# Patient Record
Sex: FEMALE | Race: WHITE | HISPANIC OR LATINO | Employment: UNEMPLOYED | URBAN - METROPOLITAN AREA
[De-identification: names, ages, dates, MRNs, and addresses within clinical notes are randomized per-mention and may not be internally consistent; named-entity substitution may affect disease eponyms.]

---

## 2024-09-10 ENCOUNTER — INITIAL PRENATAL (OUTPATIENT)
Dept: OBSTETRICS AND GYNECOLOGY | Facility: CLINIC | Age: 29
End: 2024-09-10
Payer: MEDICAID

## 2024-09-10 VITALS — SYSTOLIC BLOOD PRESSURE: 100 MMHG | DIASTOLIC BLOOD PRESSURE: 67 MMHG | WEIGHT: 123.4 LBS

## 2024-09-10 DIAGNOSIS — Z34.91 INITIAL OBSTETRIC VISIT IN FIRST TRIMESTER: Primary | ICD-10-CM

## 2024-09-10 DIAGNOSIS — Z3A.12 12 WEEKS GESTATION OF PREGNANCY: ICD-10-CM

## 2024-09-10 LAB — EXTERNAL HEPATITIS C AB: NEGATIVE

## 2024-09-10 PROCEDURE — 99203 OFFICE O/P NEW LOW 30 MIN: CPT | Performed by: OBSTETRICS & GYNECOLOGY

## 2024-09-10 RX ORDER — SWAB
1 SWAB, NON-MEDICATED MISCELLANEOUS DAILY
Qty: 90 EACH | Refills: 3 | Status: SHIPPED | OUTPATIENT
Start: 2024-09-10 | End: 2024-09-10

## 2024-09-10 RX ORDER — SWAB
1 SWAB, NON-MEDICATED MISCELLANEOUS DAILY
Qty: 90 EACH | Refills: 3 | Status: SHIPPED | OUTPATIENT
Start: 2024-09-10

## 2024-09-10 NOTE — PROGRESS NOTES
OB follow up New OB patient stated that she has been taken BP medicine and it make her BP very very low. Patient stated that she has  that is in bad shape and she feels like she is going to faint when standing up. Patient stated that she hasn't had US and is not for sure how far along she is. Patient staed that she feels a lot of pressure to her chest that is causing her to have shortness of breath     Leeroy Cunningham is a 28 y.o.  12w6d being seen today for her obstetrical visit.  Patient reports no complaints. Fetal movement: Not yet felt  This is her first visit for this pregnancy.  She is uncertain about her last menstrual period date.  She is not taking a prenatal vitamin.  She is not on any blood pressure medication.  She states that she did have preeclampsia with her first delivery that resulted in a normal vaginal delivery.    Her prenatal care is complicated by (and status): Nothing    Review of Systems  Cramping/contractions : Negative  Vaginal bleeding: Negative  Fetal movement is normal    /67   Wt 56 kg (123 lb 6.4 oz)   LMP 2024 (Approximate)     FHT: 150s BPM   Uterine Size: 16 cm       Assessment    Diagnoses and all orders for this visit:    1. Initial obstetric visit in first trimester (Primary)    2. 12 weeks gestation of pregnancy  -     OB Panel With HIV  -     Varicella Zoster Antibody, IgG  -     [id number] PLUS Core+SCA - Blood,        1) pregnancy at 12w6d   Exam and bedside ultrasound are more consistent with 15 or 16 weeks size.  Will get dating ultrasound along with prenatal panel and NIPT today.      Plan    Reviewed this stage of pregnancy  Problem list updated   Follow up in 4 weeks    Lewis Molina MD   9/10/2024  13:16 EDT

## 2024-09-11 LAB
ABO GROUP BLD: ABNORMAL
AMPHETAMINES UR QL SCN: NEGATIVE NG/ML
BARBITURATES UR QL SCN: NEGATIVE NG/ML
BASOPHILS # BLD AUTO: 0 X10E3/UL (ref 0–0.2)
BASOPHILS NFR BLD AUTO: 0 %
BENZODIAZ UR QL: NEGATIVE NG/ML
BLD GP AB SCN SERPL QL: NEGATIVE
BZE UR QL: NEGATIVE NG/ML
CANNABINOIDS UR QL SCN: NEGATIVE NG/ML
EOSINOPHIL # BLD AUTO: 0.2 X10E3/UL (ref 0–0.4)
EOSINOPHIL NFR BLD AUTO: 2 %
ERYTHROCYTE [DISTWIDTH] IN BLOOD BY AUTOMATED COUNT: 13.4 % (ref 11.7–15.4)
HBV SURFACE AG SERPL QL IA: NEGATIVE
HCT VFR BLD AUTO: 31.5 % (ref 34–46.6)
HCV AB SERPL QL IA: NORMAL
HCV IGG SERPL QL IA: NON REACTIVE
HGB BLD-MCNC: 10.3 G/DL (ref 11.1–15.9)
HIV 1+2 AB+HIV1 P24 AG SERPL QL IA: NON REACTIVE
IMM GRANULOCYTES # BLD AUTO: 0 X10E3/UL (ref 0–0.1)
IMM GRANULOCYTES NFR BLD AUTO: 0 %
LYMPHOCYTES # BLD AUTO: 1.9 X10E3/UL (ref 0.7–3.1)
LYMPHOCYTES NFR BLD AUTO: 21 %
MCH RBC QN AUTO: 30.9 PG (ref 26.6–33)
MCHC RBC AUTO-ENTMCNC: 32.7 G/DL (ref 31.5–35.7)
MCV RBC AUTO: 95 FL (ref 79–97)
METHADONE UR QL SCN: NEGATIVE NG/ML
MONOCYTES # BLD AUTO: 0.9 X10E3/UL (ref 0.1–0.9)
MONOCYTES NFR BLD AUTO: 10 %
NEUTROPHILS # BLD AUTO: 6.1 X10E3/UL (ref 1.4–7)
NEUTROPHILS NFR BLD AUTO: 67 %
OPIATES UR QL: NEGATIVE NG/ML
PCP UR QL SCN: NEGATIVE NG/ML
PLATELET # BLD AUTO: 300 X10E3/UL (ref 150–450)
PROPOXYPH UR QL SCN: NEGATIVE NG/ML
RBC # BLD AUTO: 3.33 X10E6/UL (ref 3.77–5.28)
RH BLD: POSITIVE
RPR SER QL: NON REACTIVE
RUBV IGG SERPL IA-ACNC: <0.9 INDEX
VZV IGG SER IA-ACNC: 923 INDEX
WBC # BLD AUTO: 9.2 X10E3/UL (ref 3.4–10.8)

## 2024-09-12 LAB
BACTERIA UR CULT: NO GROWTH
BACTERIA UR CULT: NORMAL

## 2024-09-13 LAB
C TRACH RRNA SPEC QL NAA+PROBE: NEGATIVE
N GONORRHOEA RRNA SPEC QL NAA+PROBE: NEGATIVE

## 2024-09-14 LAB
CFDNA.FET/CFDNA.TOTAL SFR FETUS: NORMAL %
CITATION REF LAB TEST: NORMAL
FET 13+18+21+X+Y ANEUP PLAS.CFDNA: NEGATIVE
FET CHR 21 TS PLAS.CFDNA QL: NEGATIVE
FET MS X RISK WBC.DNA+CFDNA QL: NOT DETECTED
FET SEX PLAS.CFDNA DOSAGE CFDNA: NORMAL
FET TS 13 RISK PLAS.CFDNA QL: NEGATIVE
FET TS 18 RISK WBC.DNA+CFDNA QL: NEGATIVE
FET X + Y ANEUP RISK PLAS.CFDNA SEQ-IMP: NOT DETECTED
GA EST FROM CONCEPTION DATE: NORMAL D
GESTATIONAL AGE > 9:: YES
LAB DIRECTOR NAME PROVIDER: NORMAL
LAB DIRECTOR NAME PROVIDER: NORMAL
LABORATORY COMMENT REPORT: NORMAL
LIMITATIONS OF THE TEST: NORMAL
NEGATIVE PREDICTIVE VALUE: NORMAL
NOTE: NORMAL
PERFORMANCE CHARACTERISTICS: NORMAL
POSITIVE PREDICTIVE VALUE: NORMAL
REF LAB TEST METHOD: NORMAL
TEST PERFORMANCE INFO SPEC: NORMAL

## 2024-09-16 ENCOUNTER — TELEPHONE (OUTPATIENT)
Dept: OBSTETRICS AND GYNECOLOGY | Facility: CLINIC | Age: 29
End: 2024-09-16
Payer: MEDICAID

## 2024-10-08 ENCOUNTER — REFERRAL TRIAGE (OUTPATIENT)
Dept: LABOR AND DELIVERY | Facility: HOSPITAL | Age: 29
End: 2024-10-08
Payer: MEDICAID

## 2024-10-08 ENCOUNTER — ROUTINE PRENATAL (OUTPATIENT)
Dept: OBSTETRICS AND GYNECOLOGY | Facility: CLINIC | Age: 29
End: 2024-10-08
Payer: MEDICAID

## 2024-10-08 VITALS — WEIGHT: 129.2 LBS | SYSTOLIC BLOOD PRESSURE: 98 MMHG | DIASTOLIC BLOOD PRESSURE: 63 MMHG

## 2024-10-08 DIAGNOSIS — Z34.82 PRENATAL CARE, SUBSEQUENT PREGNANCY IN SECOND TRIMESTER: ICD-10-CM

## 2024-10-08 DIAGNOSIS — Z87.59 HISTORY OF STILLBIRTH: ICD-10-CM

## 2024-10-08 DIAGNOSIS — O09.299 HX OF PREECLAMPSIA, PRIOR PREGNANCY, CURRENTLY PREGNANT: ICD-10-CM

## 2024-10-08 DIAGNOSIS — Z3A.21 21 WEEKS GESTATION OF PREGNANCY: Primary | ICD-10-CM

## 2024-10-08 LAB
GLUCOSE UR STRIP-MCNC: NEGATIVE MG/DL
HCT VFR BLD AUTO: 28.7 % (ref 34–46.6)
HGB BLD-MCNC: 9.7 G/DL (ref 12–15.9)
PROT UR STRIP-MCNC: ABNORMAL MG/DL

## 2024-10-08 RX ORDER — ASPIRIN 81 MG/1
81 TABLET, CHEWABLE ORAL DAILY
Qty: 90 TABLET | Refills: 3 | Status: SHIPPED | OUTPATIENT
Start: 2024-10-08

## 2024-10-08 RX ORDER — FERROUS SULFATE 325(65) MG
325 TABLET ORAL
Qty: 60 TABLET | Refills: 3 | Status: SHIPPED | OUTPATIENT
Start: 2024-10-08

## 2024-10-08 NOTE — PROGRESS NOTES
OB follow up     Leeroy Cunningham is a 29 y.o.  21w1d being seen today for her obstetrical visit.  Patient reports no complaints. Fetal movement: normal.  Fetal anatomy and growth appear normal today.  She is experiencing some fatigue and is concerned about her hemoglobin level of 10.3.  She is taking her prenatal vitamin regularly but does request iron supplementation.  I reviewed her obstetric history more thoroughly.  She was admitted to the hospital and watched for 2 weeks due to blood pressure and development of preeclampsia.  She unfortunately had a fetal demise while in the hospital.  She was induced in labor and delivered vaginally and had a postpartum hemorrhage that required transfusion.    Her prenatal care is complicated by (and status): History of preeclampsia    Review of Systems  Cramping/contractions : Negative  Vaginal bleeding: Negative  Fetal movement is normal    BP 98/63   Wt 58.6 kg (129 lb 3.2 oz)   LMP 2024 (Approximate)     FHT: -150 BPM   Uterine Size: size equals dates       Assessment    Diagnoses and all orders for this visit:    1. 21 weeks gestation of pregnancy (Primary)  -     POC Urinalysis Dipstick  -     Hemoglobin & Hematocrit, Blood    2. Prenatal care, subsequent pregnancy in second trimester    3. Hx of preeclampsia, prior pregnancy, currently pregnant    4. History of stillbirth    Other orders  -     aspirin 81 MG chewable tablet; Chew 1 tablet Daily.  Dispense: 90 tablet; Refill: 3  -     ferrous sulfate 325 (65 FE) MG tablet; Take 1 tablet by mouth Daily With Breakfast.  Dispense: 60 tablet; Refill: 3        1) pregnancy at 21w1d         Plan    Reviewed this stage of pregnancy  Problem list updated   Follow up in 4 weeks.  Will recheck her hemoglobin on next visit.  Will initiate antepartum fetal surveillance at 34 weeks and consider 38 to 39-week induction of labor.    Lewis Molina MD   10/8/2024  09:59 EDT

## 2024-10-16 ENCOUNTER — PATIENT OUTREACH (OUTPATIENT)
Dept: LABOR AND DELIVERY | Facility: HOSPITAL | Age: 29
End: 2024-10-16
Payer: MEDICAID

## 2024-10-16 NOTE — OUTREACH NOTE
[facility]  Enrollment    Current Estimated Gestational Age: 22w2d    Questions/Answers      Flowsheet Row Responses   Would like to participate? Yes   Date of Intake Visit 10/16/24            Elio Montiel RN  Maternity Nurse Navigator    10/16/2024, 14:55 EDT    [facility]  Intake    Current Estimated Gestational Age: 22w2d    Intake Assessment      Flowsheet Row Responses   Best Method for Contacting Cell   Currently Employed No   Able to keep appointments as scheduled Yes   Gender(s) and Name(s) f   Do you have a dentist? No   Resources Presently Utilizing: WIC (Women, Infant, Children), Other   Other Resources Utilizing: SNAP   Maternal Warning Signs Provided   Other Education HANDS, How to find a dentist, How to find a primary care provider, Insurance benefits/Incentives, Mental Health Services, Transportation Assistance            Tobacco, Alcohol, and Drug History     reports that she has never smoked. She has never used smokeless tobacco.   reports that she does not currently use alcohol.   reports no history of drug use.    Elio Montiel RN  Maternity Nurse Navigator    10/16/2024, 15:19 EDT    [facility]  Check-In    Current Estimated Gestational Age: 22w2d      Questions/Answers      Flowsheet Row Responses   Best Method for Contacting Cell   Demographics Reviewed Yes   Currently Employed No   Able to keep appointments as scheduled Yes   Gender(s) and Name(s) f   Baby Active/Feeling Fetal Movemen Yes   How are you presently feeling? good   Questions regarding prenatal visits or tests to be ordered? No   May I ask you questions about your substance use? --  [denies]   Resource/Environmental Concerns Reliable Transportation   Do you have any questions related to your care experience, your pregnancy, plans for delivery, any concerns, etc? No   Other Education HANDS, How to find a dentist, How to find a primary care provider, Insurance benefits/Incentives, Mental Health Services,  Transportation Assistance            Pt doing well today.  Has WIC and SNAP.  Enc to start YULI process and call insurance for pg rewards.  Dentistry sent-having molar pain recently.  Discussed [employer] and she plans to set up her account.  Sent intake packet +food.  Maternal warning s/s reviewed, pt verbalized understanding.  Encouraged to reach out with any needs.  Will follow up again in 2 wks.    , Chris [phone], used.     Elio Montiel RN  Maternity Nurse Navigator    10/16/2024, 15:19 EDT

## 2024-10-30 ENCOUNTER — PATIENT OUTREACH (OUTPATIENT)
Dept: LABOR AND DELIVERY | Facility: HOSPITAL | Age: 29
End: 2024-10-30
Payer: MEDICAID

## 2024-10-30 NOTE — OUTREACH NOTE
"[facility]  Check-In    Current Estimated Gestational Age: 24w2d      Questions/Answers      Flowsheet Row Responses   Best Method for Contacting Cell   Demographics Reviewed Yes   Currently Employed No   Able to keep appointments as scheduled Yes   Gender(s) and Name(s) f   Baby Active/Feeling Fetal Movemen Yes   How are you presently feeling? good   Questions regarding prenatal visits or tests to be ordered? No   Resource/Environmental Concerns Reliable Transportation   Do you have any questions related to your care experience, your pregnancy, plans for delivery, any concerns, etc? No   Other Education Transportation Assistance           [id number] used.     Pt doing well today.  Reports \"a lot of FM\".  Enc her to set up [employer] for appt on 11/7.  She has been calling dental but they have not had a .  MNN called Joceline Guzmán to schedule for Friday, possibly being moved to tomorrow.  OB office to send release over to dental office for pt to be seen.  No supplies yet for baby. Maternal warning s/s reviewed, pt verbalized understanding.  Encouraged to reach out with any needs.  Will follow up again prn.  Sent dental and FT info today.    Elio Montiel RN  Maternity Nurse Navigator    10/30/2024, 10:18 EDT          "

## 2024-11-07 ENCOUNTER — ROUTINE PRENATAL (OUTPATIENT)
Dept: OBSTETRICS AND GYNECOLOGY | Facility: CLINIC | Age: 29
End: 2024-11-07
Payer: MEDICAID

## 2024-11-07 VITALS — SYSTOLIC BLOOD PRESSURE: 99 MMHG | WEIGHT: 135 LBS | DIASTOLIC BLOOD PRESSURE: 66 MMHG

## 2024-11-07 DIAGNOSIS — Z34.82 PRENATAL CARE, SUBSEQUENT PREGNANCY IN SECOND TRIMESTER: ICD-10-CM

## 2024-11-07 DIAGNOSIS — O99.019 MATERNAL ANEMIA IN PREGNANCY, ANTEPARTUM: ICD-10-CM

## 2024-11-07 DIAGNOSIS — Z3A.25 25 WEEKS GESTATION OF PREGNANCY: Primary | ICD-10-CM

## 2024-11-07 DIAGNOSIS — Z87.59 HISTORY OF STILLBIRTH: ICD-10-CM

## 2024-11-07 LAB
GLUCOSE UR STRIP-MCNC: NEGATIVE MG/DL
PROT UR STRIP-MCNC: ABNORMAL MG/DL

## 2024-11-07 NOTE — PROGRESS NOTES
OB follow up     Leeroy Cunningham is a 29 y.o.  25w3d being seen today for her obstetrical visit.  Patient reports no complaints. Fetal movement: normal.  She does report some periodic vagal episodes.  She is taking her prenatal vitamin and her iron supplement regularly and she is also taking the baby aspirin.    Her prenatal care is complicated by (and status): Maternal anemia and history of stillbirth related to severe preeclampsia    Review of Systems  Cramping/contractions : Negative  Vaginal bleeding: Negative  Fetal movement normal    BP 99/66   Wt 61.2 kg (135 lb)   LMP 2024 (Approximate)     FHT: 160 BPM   Uterine Size: size equals dates       Assessment    Diagnoses and all orders for this visit:    1. 25 weeks gestation of pregnancy (Primary)  -     POC Urinalysis Dipstick  -     Gestational Screen 1 Hr ([facility]); Future  -     Hemoglobin & Hematocrit, Blood; Future  -     Treponema pallidum AB w/Reflex RPR; Future    2. Prenatal care, subsequent pregnancy in second trimester    3. History of stillbirth    4. Maternal anemia in pregnancy, antepartum        1) pregnancy at 25w3d         Plan    Reviewed this stage of pregnancy  Problem list updated   Follow up in 3 weeks.  Will increase salt load and oral intake.  Will do 1 hour glucose on next visit.    Lewis Molina MD   2024  11:43 EST

## 2024-12-04 ENCOUNTER — PATIENT OUTREACH (OUTPATIENT)
Dept: LABOR AND DELIVERY | Facility: HOSPITAL | Age: 29
End: 2024-12-04
Payer: MEDICAID

## 2024-12-04 NOTE — OUTREACH NOTE
[facility]  Unable to Reach    Questions/Answers      Flowsheet Row Responses   Pending Outreach Prenatal Check-in   Call Attempt First  [28]   Outcome Left message           [phone] used, left msg with pt's friend who has phone.  Pt will have phone tomorrow, will try again at that time.    Elio Montiel RN  Maternity Nurse Navigator    12/4/2024, 09:07 EST

## 2024-12-05 ENCOUNTER — PATIENT OUTREACH (OUTPATIENT)
Dept: LABOR AND DELIVERY | Facility: HOSPITAL | Age: 29
End: 2024-12-05
Payer: MEDICAID

## 2024-12-05 ENCOUNTER — ROUTINE PRENATAL (OUTPATIENT)
Dept: OBSTETRICS AND GYNECOLOGY | Facility: CLINIC | Age: 29
End: 2024-12-05
Payer: MEDICAID

## 2024-12-05 VITALS — DIASTOLIC BLOOD PRESSURE: 66 MMHG | SYSTOLIC BLOOD PRESSURE: 103 MMHG | WEIGHT: 141.2 LBS

## 2024-12-05 DIAGNOSIS — Z87.59 HISTORY OF STILLBIRTH: ICD-10-CM

## 2024-12-05 DIAGNOSIS — Z34.83 PRENATAL CARE, SUBSEQUENT PREGNANCY IN THIRD TRIMESTER: ICD-10-CM

## 2024-12-05 DIAGNOSIS — Z3A.29 29 WEEKS GESTATION OF PREGNANCY: Primary | ICD-10-CM

## 2024-12-05 DIAGNOSIS — O99.019 MATERNAL ANEMIA IN PREGNANCY, ANTEPARTUM: ICD-10-CM

## 2024-12-05 LAB
GLUCOSE UR STRIP-MCNC: NEGATIVE MG/DL
PROT UR STRIP-MCNC: NEGATIVE MG/DL

## 2024-12-05 NOTE — PROGRESS NOTES
OB follow up     Leeroy Cunningham is a 29 y.o.  29w3d being seen today for her obstetrical visit.  Patient reports backache. Fetal movement: normal.    Her prenatal care is complicated by (and status): History of term stillbirth related to preeclampsia    Review of Systems  Cramping/contractions : Occasional  Vaginal bleeding: Negative  Fetal movement is normal    /66   Wt 64 kg (141 lb 3.2 oz)   LMP 2024 (Approximate)     FHT: 152 BPM   Uterine Size: size equals dates       Assessment    Diagnoses and all orders for this visit:    1. 29 weeks gestation of pregnancy (Primary)  -     POC Urinalysis Dipstick    2. Prenatal care, subsequent pregnancy in third trimester    3. Maternal anemia in pregnancy, antepartum    4. History of stillbirth        1) pregnancy at 29w3d         Plan    Reviewed this stage of pregnancy  Problem list updated   Follow up in 3 weeks and will get growth ultrasound on that day and discuss Tdap and RSV vaccines on that visit.  I gave her written information about sciatic discomfort and measures to improve her discomfort while pregnant.  1 hour glucose being done today and follow-up hemoglobin being done.  Lewis Molina MD   2024  09:21 EST

## 2024-12-05 NOTE — OUTREACH NOTE
[facility]  Unable to Reach    Questions/Answers      Flowsheet Row Responses   Pending Outreach Prenatal Check-in   Call Attempt Second  [29]   Outcome Left message   Unable to reach comments:  [phone] used.           [id number] used.     Friend has her phone today.  He will let pt know to check her mychart and call/msg MNN with any needs.    Elio Montiel RN  Maternity Nurse Navigator    12/5/2024, 10:44 EST

## 2024-12-11 ENCOUNTER — PATIENT OUTREACH (OUTPATIENT)
Dept: LABOR AND DELIVERY | Facility: HOSPITAL | Age: 29
End: 2024-12-11
Payer: MEDICAID

## 2024-12-11 NOTE — OUTREACH NOTE
[facility]  Check-In    Current Estimated Gestational Age: 30w2d      Questions/Answers      Flowsheet Row Responses   Best Method for Contacting Cell   Demographics Reviewed Yes   Currently Employed No   Able to keep appointments as scheduled Yes   Gender(s) and Name(s) f   Baby Active/Feeling Fetal Movemen Yes   How are you presently feeling? good   Questions regarding prenatal visits or tests to be ordered? No   Resource/Environmental Concerns None   Do you have any questions related to your care experience, your pregnancy, plans for delivery, any concerns, etc? No   Other Education How to find a pediatrician          Pt doing well.  Reviewed 28wk packet prev sent.  Chose Dr Chavez for ped.  Maternal warning s/s reviewed, pt verbalized understanding.  Encouraged to reach out with any needs.  Will follow up again around 34wks.     [id number] used.    Eloi Montiel RN  Maternity Nurse Navigator    12/11/2024, 11:12 EST

## 2025-01-02 ENCOUNTER — ROUTINE PRENATAL (OUTPATIENT)
Dept: OBSTETRICS AND GYNECOLOGY | Facility: CLINIC | Age: 30
End: 2025-01-02
Payer: MEDICAID

## 2025-01-02 VITALS — WEIGHT: 144.8 LBS | SYSTOLIC BLOOD PRESSURE: 99 MMHG | DIASTOLIC BLOOD PRESSURE: 65 MMHG

## 2025-01-02 DIAGNOSIS — Z87.59 HISTORY OF STILLBIRTH: ICD-10-CM

## 2025-01-02 DIAGNOSIS — O99.019 MATERNAL ANEMIA IN PREGNANCY, ANTEPARTUM: ICD-10-CM

## 2025-01-02 DIAGNOSIS — Z34.83 PRENATAL CARE, SUBSEQUENT PREGNANCY IN THIRD TRIMESTER: ICD-10-CM

## 2025-01-02 DIAGNOSIS — Z3A.33 33 WEEKS GESTATION OF PREGNANCY: Primary | ICD-10-CM

## 2025-01-02 LAB
GLUCOSE UR STRIP-MCNC: NEGATIVE MG/DL
PROT UR STRIP-MCNC: ABNORMAL MG/DL

## 2025-01-02 NOTE — PROGRESS NOTES
OB follow up     Leeroy Cunningham is a 29 y.o.  33w3d being seen today for her obstetrical visit.  Patient reports no complaints. Fetal movement: normal.  Comparisons previous obstetric ultrasound. Today study shows cephalic presentation with low normal amniotic fluid volume with amniotic fluid index of 8.6.. Fetal heart rate is 134 bpm. Fetal growth today is at 53rd percentile with abdominal circumference at 74th percentile for 33 weeks 3 days.   Her prenatal care is complicated by (and status): History of preeclampsia and fetal demise at term    Review of Systems  Cramping/contractions : Negative  Vaginal bleeding: Negative  Fetal movement is normal    BP 99/65   Wt 65.7 kg (144 lb 12.8 oz)   LMP 2024 (Approximate)     FHT: 134 BPM   Uterine Size: size equals dates       Assessment    Diagnoses and all orders for this visit:    1. 33 weeks gestation of pregnancy (Primary)  -     POC Urinalysis Dipstick    2. Prenatal care, subsequent pregnancy in third trimester    3. History of stillbirth    4. Maternal anemia in pregnancy, antepartum  -     CBC (No Diff); Future        1) pregnancy at 33w3d         Plan    Reviewed this stage of pregnancy  Problem list updated   Follow up in 2 weeks.  She is getting Tdap and RSV vaccines today.  Will plan CBC on her next visit.  Blood pressure is doing well.  Current plan would be induction of labor at 39 weeks (will likely schedule for 2/10/2025)    Lewis Molina MD   2025  10:15 EST

## 2025-01-08 DIAGNOSIS — O99.713 PRURITUS OF PREGNANCY IN THIRD TRIMESTER: Primary | ICD-10-CM

## 2025-01-08 DIAGNOSIS — L29.9 PRURITUS OF PREGNANCY IN THIRD TRIMESTER: Primary | ICD-10-CM

## 2025-01-09 ENCOUNTER — LAB (OUTPATIENT)
Dept: OBSTETRICS AND GYNECOLOGY | Facility: CLINIC | Age: 30
End: 2025-01-09
Payer: MEDICAID

## 2025-01-09 ENCOUNTER — PATIENT OUTREACH (OUTPATIENT)
Dept: LABOR AND DELIVERY | Facility: HOSPITAL | Age: 30
End: 2025-01-09
Payer: MEDICAID

## 2025-01-09 DIAGNOSIS — O99.713 PRURITUS OF PREGNANCY IN THIRD TRIMESTER: ICD-10-CM

## 2025-01-09 DIAGNOSIS — O99.019 MATERNAL ANEMIA IN PREGNANCY, ANTEPARTUM: ICD-10-CM

## 2025-01-09 DIAGNOSIS — L29.9 PRURITUS OF PREGNANCY IN THIRD TRIMESTER: ICD-10-CM

## 2025-01-09 NOTE — OUTREACH NOTE
[facility]  Unable to Reach    Questions/Answers      Flowsheet Row Responses   Pending Outreach Prenatal Check-in   Call Attempt First  [34]   Outcome Left message   Unable to reach comments:  [phone] used.            Left vm, encouraged to reach out with any needs. Will call again at a later date.      Elio Montiel RN  Maternity Nurse Navigator    1/9/2025, 13:26 EST

## 2025-01-10 LAB
ALBUMIN SERPL-MCNC: 3.5 G/DL (ref 4–5)
ALP SERPL-CCNC: 148 IU/L (ref 44–121)
ALT SERPL-CCNC: 77 IU/L (ref 0–32)
AST SERPL-CCNC: 40 IU/L (ref 0–40)
BILIRUB SERPL-MCNC: 0.6 MG/DL (ref 0–1.2)
BUN SERPL-MCNC: 7 MG/DL (ref 6–20)
BUN/CREAT SERPL: 14 (ref 9–23)
CALCIUM SERPL-MCNC: 9.3 MG/DL (ref 8.7–10.2)
CHLORIDE SERPL-SCNC: 103 MMOL/L (ref 96–106)
CO2 SERPL-SCNC: 20 MMOL/L (ref 20–29)
CREAT SERPL-MCNC: 0.5 MG/DL (ref 0.57–1)
EGFRCR SERPLBLD CKD-EPI 2021: 131 ML/MIN/1.73
ERYTHROCYTE [DISTWIDTH] IN BLOOD BY AUTOMATED COUNT: 13.1 % (ref 11.7–15.4)
GLOBULIN SER CALC-MCNC: 2.8 G/DL (ref 1.5–4.5)
GLUCOSE SERPL-MCNC: 130 MG/DL (ref 70–99)
HCT VFR BLD AUTO: 31.9 % (ref 34–46.6)
HGB BLD-MCNC: 10.5 G/DL (ref 11.1–15.9)
MCH RBC QN AUTO: 31.4 PG (ref 26.6–33)
MCHC RBC AUTO-ENTMCNC: 32.9 G/DL (ref 31.5–35.7)
MCV RBC AUTO: 96 FL (ref 79–97)
PLATELET # BLD AUTO: 336 X10E3/UL (ref 150–450)
POTASSIUM SERPL-SCNC: 3.9 MMOL/L (ref 3.5–5.2)
PROT SERPL-MCNC: 6.3 G/DL (ref 6–8.5)
RBC # BLD AUTO: 3.34 X10E6/UL (ref 3.77–5.28)
SODIUM SERPL-SCNC: 136 MMOL/L (ref 134–144)
WBC # BLD AUTO: 8.5 X10E3/UL (ref 3.4–10.8)

## 2025-01-12 LAB — BILE AC SERPL-SCNC: 10.2 UMOL/L (ref 0–10)

## 2025-01-15 ENCOUNTER — PATIENT OUTREACH (OUTPATIENT)
Dept: LABOR AND DELIVERY | Facility: HOSPITAL | Age: 30
End: 2025-01-15
Payer: MEDICAID

## 2025-01-15 NOTE — OUTREACH NOTE
[facility]  Unable to Reach    Questions/Answers      Flowsheet Row Responses   Pending Outreach Prenatal Check-in   Call Attempt Second   Outcome Left message, [employer] message sent to patient           [phone] used.     Left vm and sent 36wk packet via [facility], encouraged to reach out with any needs. Will call again at a later date.      Elio Montiel RN  Maternity Nurse Navigator    1/15/2025, 11:52 EST

## 2025-01-23 ENCOUNTER — ROUTINE PRENATAL (OUTPATIENT)
Dept: OBSTETRICS AND GYNECOLOGY | Facility: CLINIC | Age: 30
End: 2025-01-23
Payer: MEDICAID

## 2025-01-23 VITALS — SYSTOLIC BLOOD PRESSURE: 110 MMHG | DIASTOLIC BLOOD PRESSURE: 73 MMHG | WEIGHT: 151 LBS

## 2025-01-23 DIAGNOSIS — O99.019 MATERNAL ANEMIA IN PREGNANCY, ANTEPARTUM: ICD-10-CM

## 2025-01-23 DIAGNOSIS — Z34.83 PRENATAL CARE, SUBSEQUENT PREGNANCY IN THIRD TRIMESTER: ICD-10-CM

## 2025-01-23 DIAGNOSIS — Z3A.36 36 WEEKS GESTATION OF PREGNANCY: Primary | ICD-10-CM

## 2025-01-23 DIAGNOSIS — Z87.59 HISTORY OF STILLBIRTH: ICD-10-CM

## 2025-01-23 LAB
GLUCOSE UR STRIP-MCNC: NEGATIVE MG/DL
PROT UR STRIP-MCNC: NEGATIVE MG/DL

## 2025-01-23 NOTE — PROGRESS NOTES
OB follow up     Leeroy Cunningham is a 29 y.o.  36w3d being seen today for her obstetrical visit.  Patient reports no complaints. Fetal movement: normal.    Her prenatal care is complicated by (and status): History of IUFD    Review of Systems  Cramping/contractions : Negative  Vaginal bleeding: Negative  Fetal movement is normal    /73   Wt 68.5 kg (151 lb)   LMP 2024 (Approximate)     FHT: 138 BPM   Uterine Size: size equals dates       Assessment    Diagnoses and all orders for this visit:    1. 36 weeks gestation of pregnancy (Primary)  -     Group B Streptococcus Culture - Swab, Vaginal/Rectum  -     POC Urinalysis Dipstick    2. Prenatal care, subsequent pregnancy in third trimester    3. Maternal anemia in pregnancy, antepartum    4. History of stillbirth        1) pregnancy at 36w3d         Plan    Reviewed this stage of pregnancy  Problem list updated   Follow up in 1 week.  Group B strep screen done today.  Cervix is 1/60/-2 and posterior.  Will see weekly with biophysical profile.  Blood pressure is doing very well.  Will likely be looking at 39-week induction.  Will discuss further on next visit.    Lewis Molina MD   2025  09:25 EST

## 2025-01-27 LAB — B-HEM STREP SPEC QL CULT: NEGATIVE

## 2025-01-27 RX ORDER — METHYLPREDNISOLONE 4 MG/1
TABLET ORAL
Qty: 21 TABLET | Refills: 1 | Status: SHIPPED | OUTPATIENT
Start: 2025-01-27

## 2025-01-30 ENCOUNTER — ROUTINE PRENATAL (OUTPATIENT)
Dept: OBSTETRICS AND GYNECOLOGY | Facility: CLINIC | Age: 30
End: 2025-01-30
Payer: MEDICAID

## 2025-01-30 ENCOUNTER — TELEPHONE (OUTPATIENT)
Dept: LABOR AND DELIVERY | Facility: HOSPITAL | Age: 30
End: 2025-01-30
Payer: MEDICAID

## 2025-01-30 VITALS — DIASTOLIC BLOOD PRESSURE: 73 MMHG | WEIGHT: 153 LBS | SYSTOLIC BLOOD PRESSURE: 111 MMHG

## 2025-01-30 DIAGNOSIS — Z3A.37 37 WEEKS GESTATION OF PREGNANCY: Primary | ICD-10-CM

## 2025-01-30 DIAGNOSIS — Z34.83 PRENATAL CARE, SUBSEQUENT PREGNANCY IN THIRD TRIMESTER: ICD-10-CM

## 2025-01-30 DIAGNOSIS — Z87.59 HISTORY OF STILLBIRTH: ICD-10-CM

## 2025-01-30 LAB
GLUCOSE UR STRIP-MCNC: ABNORMAL MG/DL
PROT UR STRIP-MCNC: NEGATIVE MG/DL

## 2025-01-30 NOTE — PROGRESS NOTES
OB follow up     Leeroy Cunningham is a 29 y.o.  37w3d being seen today for her obstetrical visit.  Patient reports no complaints. Fetal movement: normal.  Comparison is previous obstetric ultrasound. Today's study shows cephalic presentation with normal amniotic fluid volume. Fetal heart rate is 138 bpm. Fetal growth today is at 65th percentile with abdominal circumference at 96 percentile for 37 weeks 3 days. Biophysical profile is 8/8   Her prenatal care is complicated by (and status): History of preeclampsia with term demise    Review of Systems  Cramping/contractions : Negative  Vaginal bleeding: Negative  Fetal movement is normal    /73   Wt 69.4 kg (153 lb)   LMP 2024 (Approximate)     FHT: 138 BPM   Uterine Size: size equals dates       Assessment    Diagnoses and all orders for this visit:    1. 37 weeks gestation of pregnancy (Primary)  -     POC Urinalysis Dipstick    2. Prenatal care, subsequent pregnancy in third trimester    3. History of stillbirth        1) pregnancy at 37w3d         Plan    Reviewed this stage of pregnancy  Problem list updated   Follow up in 1 week.  She will get biophysical profile next week.  Her blood pressure is stable with negative proteinuria.  I told her that I will be out of town next week but that we will schedule an induction of labor at midnight on 2/10/2025 ( going into Monday).    Lewis Molina MD   2025  09:57 EST

## 2025-01-30 NOTE — TELEPHONE ENCOUNTER
[id number] used.  Pt has consented to use [employer] for transportation to OB office.  Pt missed first [employer] ride.  MNN requested new ride, pt in car by end of phone call. Flexible return ride home.  Call placed to office/ to know pt's ETA.  Elio BUSTOS RN

## 2025-02-03 ENCOUNTER — PATIENT OUTREACH (OUTPATIENT)
Dept: LABOR AND DELIVERY | Facility: HOSPITAL | Age: 30
End: 2025-02-03
Payer: MEDICAID

## 2025-02-03 NOTE — OUTREACH NOTE
[facility]  Check-In    Current Estimated Gestational Age: 38w0d      Questions/Answers      Flowsheet Row Responses   Best Method for Contacting Cell   Demographics Reviewed Yes   Currently Employed Yes   Able to keep appointments as scheduled Yes   Gender(s) and Name(s) f   Baby Active/Feeling Fetal Movemen Yes   How are you presently feeling? good   Are you having any of the following symptoms? Other   Other Symptoms: itching-hands, feet, torso-OB aware   Questions regarding prenatal visits or tests to be ordered? No   Supplies ready for baby Clothing, Crib, Diapers, Feeding Supplies   Resource/Environmental Concerns None   Do you have any questions related to your care experience, your pregnancy, plans for delivery, any concerns, etc? No             [phone] used.    Pt doing well today.  Will have all baby supplies by admission.  Still reports itching, has been taking meds prescribed by OB.  Has not ordered breast pump, will get while in pt. Discussed IOL and visitors policy.  Previously sent 36wk packet.  Maternal warning s/s reviewed, pt verbalized understanding.  Encouraged to reach out with any needs.  Will follow up again RANCHO.    Elio Montiel RN  Maternity Nurse Navigator    2/3/2025, 12:37 EST

## 2025-02-06 ENCOUNTER — ROUTINE PRENATAL (OUTPATIENT)
Dept: OBSTETRICS AND GYNECOLOGY | Facility: CLINIC | Age: 30
End: 2025-02-06
Payer: MEDICAID

## 2025-02-06 VITALS — SYSTOLIC BLOOD PRESSURE: 109 MMHG | WEIGHT: 152 LBS | DIASTOLIC BLOOD PRESSURE: 68 MMHG

## 2025-02-06 DIAGNOSIS — Z34.93 PRENATAL CARE IN THIRD TRIMESTER: ICD-10-CM

## 2025-02-06 DIAGNOSIS — Z3A.38 38 WEEKS GESTATION OF PREGNANCY: Primary | ICD-10-CM

## 2025-02-06 DIAGNOSIS — Z87.59 HISTORY OF STILLBIRTH: ICD-10-CM

## 2025-02-06 NOTE — PROGRESS NOTES
Chief Complaint   Patient presents with    Routine Prenatal Visit       OB follow up     Leeroy Cunningham is a 29 y.o.  38w3d being seen today for her obstetrical visit.  Patient reports no complaints. Fetal movement: normal.  used for this visit    Review of Systems  Cramping/contractions: denies  Vaginal bleeding: denies  Fetal movement: normal    /68   Wt 68.9 kg (152 lb)   LMP 2024 (Approximate)   Prenatal Assessment  Fetal Heart Rate: 140  Movement: Present  Presentation: Vertex  Dilation/Effacement/Station  Dilation: 1  Effacement (%): 50  Station: -3  Edema  LLE Edema: None  RLE Edema: None  Facial Edema: None    Assessment/Plan    Diagnoses and all orders for this visit:    1. 38 weeks gestation of pregnancy (Primary)    2. Prenatal care in third trimester    3. History of stillbirth      BPP , BABAK: 15.13 cm, Placental location: Anterior, Fetal position: Vertex  Scheduled IOL 25  Reviewed fetal kick counts  Reviewed S&S labor  GBS negative  Reviewed this stage of pregnancy  Problem list updated     Follow up in 1 week(s) with Dr. Molina    I spent 20 minutes caring for Leeroy on this date of service. This time includes time spent by me in the following activities: preparing for the visit, reviewing tests, performing a medically appropriate examination and/or evaluation, counseling and educating the patient/family/caregiver, referring and communicating with other health care professionals, documenting information in the medical record, independently interpreting results and communicating that information with the patient/family/caregiver, care coordination, ordering medications, ordering test(s), obtaining a separately obtained history, and reviewing a separately obtained history    Isabella Sargent, APRN  2025  10:29 EST\

## 2025-02-09 ENCOUNTER — ANESTHESIA EVENT (OUTPATIENT)
Dept: LABOR AND DELIVERY | Facility: HOSPITAL | Age: 30
End: 2025-02-09
Payer: MEDICAID

## 2025-02-09 ENCOUNTER — ANESTHESIA (OUTPATIENT)
Dept: LABOR AND DELIVERY | Facility: HOSPITAL | Age: 30
End: 2025-02-09
Payer: MEDICAID

## 2025-02-09 ENCOUNTER — HOSPITAL ENCOUNTER (INPATIENT)
Facility: HOSPITAL | Age: 30
LOS: 1 days | Discharge: HOME | End: 2025-02-10
Attending: OBSTETRICS & GYNECOLOGY | Admitting: OBSTETRICS & GYNECOLOGY
Payer: MEDICAID

## 2025-02-09 DIAGNOSIS — R79.89 ELEVATED LFTS: Primary | ICD-10-CM

## 2025-02-09 PROBLEM — Z34.90 PREGNANCY: Status: ACTIVE | Noted: 2025-02-09

## 2025-02-09 LAB
A1 MICROGLOB PLACENTAL VAG QL: NEGATIVE
ABO GROUP BLD: NORMAL
ALBUMIN SERPL-MCNC: 3.6 G/DL (ref 3.5–5.2)
ALBUMIN/GLOB SERPL: 0.9 G/DL
ALP SERPL-CCNC: 245 U/L (ref 39–117)
ALT SERPL W P-5'-P-CCNC: 123 U/L (ref 1–33)
ANION GAP SERPL CALCULATED.3IONS-SCNC: 13 MMOL/L (ref 5–15)
AST SERPL-CCNC: 55 U/L (ref 1–32)
BASOPHILS # BLD AUTO: 0.03 10*3/MM3 (ref 0–0.2)
BASOPHILS NFR BLD AUTO: 0.3 % (ref 0–1.5)
BILIRUB SERPL-MCNC: 0.8 MG/DL (ref 0–1.2)
BLD GP AB SCN SERPL QL: NEGATIVE
BUN SERPL-MCNC: 15 MG/DL (ref 6–20)
BUN/CREAT SERPL: 24.2 (ref 7–25)
CALCIUM SPEC-SCNC: 9.4 MG/DL (ref 8.6–10.5)
CHLORIDE SERPL-SCNC: 102 MMOL/L (ref 98–107)
CO2 SERPL-SCNC: 20 MMOL/L (ref 22–29)
CREAT SERPL-MCNC: 0.62 MG/DL (ref 0.57–1)
CREAT UR-MCNC: 59.8 MG/DL
DEPRECATED RDW RBC AUTO: 44.6 FL (ref 37–54)
EGFRCR SERPLBLD CKD-EPI 2021: 124.6 ML/MIN/1.73
EOSINOPHIL # BLD AUTO: 0.12 10*3/MM3 (ref 0–0.4)
EOSINOPHIL NFR BLD AUTO: 1.1 % (ref 0.3–6.2)
ERYTHROCYTE [DISTWIDTH] IN BLOOD BY AUTOMATED COUNT: 13.1 % (ref 12.3–15.4)
GLOBULIN UR ELPH-MCNC: 3.8 GM/DL
GLUCOSE SERPL-MCNC: 87 MG/DL (ref 65–99)
HCT VFR BLD AUTO: 35.4 % (ref 34–46.6)
HGB BLD-MCNC: 11.8 G/DL (ref 12–15.9)
IMM GRANULOCYTES # BLD AUTO: 0.08 10*3/MM3 (ref 0–0.05)
IMM GRANULOCYTES NFR BLD AUTO: 0.8 % (ref 0–0.5)
LYMPHOCYTES # BLD AUTO: 2.68 10*3/MM3 (ref 0.7–3.1)
LYMPHOCYTES NFR BLD AUTO: 25.2 % (ref 19.6–45.3)
MCH RBC QN AUTO: 31.1 PG (ref 26.6–33)
MCHC RBC AUTO-ENTMCNC: 33.3 G/DL (ref 31.5–35.7)
MCV RBC AUTO: 93.2 FL (ref 79–97)
MONOCYTES # BLD AUTO: 0.93 10*3/MM3 (ref 0.1–0.9)
MONOCYTES NFR BLD AUTO: 8.7 % (ref 5–12)
NEUTROPHILS NFR BLD AUTO: 6.8 10*3/MM3 (ref 1.7–7)
NEUTROPHILS NFR BLD AUTO: 63.9 % (ref 42.7–76)
NRBC BLD AUTO-RTO: 0 /100 WBC (ref 0–0.2)
PLATELET # BLD AUTO: 367 10*3/MM3 (ref 140–450)
PMV BLD AUTO: 9.7 FL (ref 6–12)
POTASSIUM SERPL-SCNC: 3.7 MMOL/L (ref 3.5–5.2)
PROT ?TM UR-MCNC: 12.1 MG/DL
PROT SERPL-MCNC: 7.4 G/DL (ref 6–8.5)
PROT/CREAT UR: 202.3 MG/G CREA (ref 0–200)
RBC # BLD AUTO: 3.8 10*6/MM3 (ref 3.77–5.28)
RH BLD: POSITIVE
SODIUM SERPL-SCNC: 135 MMOL/L (ref 136–145)
T&S EXPIRATION DATE: NORMAL
TREPONEMA PALLIDUM IGG+IGM AB [PRESENCE] IN SERUM OR PLASMA BY IMMUNOASSAY: NORMAL
WBC NRBC COR # BLD AUTO: 10.64 10*3/MM3 (ref 3.4–10.8)

## 2025-02-09 PROCEDURE — 82570 ASSAY OF URINE CREATININE: CPT | Performed by: OBSTETRICS & GYNECOLOGY

## 2025-02-09 PROCEDURE — 84156 ASSAY OF PROTEIN URINE: CPT | Performed by: OBSTETRICS & GYNECOLOGY

## 2025-02-09 PROCEDURE — 86850 RBC ANTIBODY SCREEN: CPT | Performed by: OBSTETRICS & GYNECOLOGY

## 2025-02-09 PROCEDURE — 99202 OFFICE O/P NEW SF 15 MIN: CPT | Performed by: OBSTETRICS & GYNECOLOGY

## 2025-02-09 PROCEDURE — 0HQ9XZZ REPAIR PERINEUM SKIN, EXTERNAL APPROACH: ICD-10-PCS | Performed by: OBSTETRICS & GYNECOLOGY

## 2025-02-09 PROCEDURE — 25810000003 LACTATED RINGERS PER 1000 ML: Performed by: OBSTETRICS & GYNECOLOGY

## 2025-02-09 PROCEDURE — 25010000002 ONDANSETRON PER 1 MG: Performed by: ANESTHESIOLOGY

## 2025-02-09 PROCEDURE — C1755 CATHETER, INTRASPINAL: HCPCS | Performed by: ANESTHESIOLOGY

## 2025-02-09 PROCEDURE — 86780 TREPONEMA PALLIDUM: CPT | Performed by: OBSTETRICS & GYNECOLOGY

## 2025-02-09 PROCEDURE — 25810000003 LACTATED RINGERS SOLUTION: Performed by: OBSTETRICS & GYNECOLOGY

## 2025-02-09 PROCEDURE — 25010000002 LIDOCAINE-EPINEPHRINE (PF) 1 %-1:200000 SOLUTION: Performed by: ANESTHESIOLOGY

## 2025-02-09 PROCEDURE — 85025 COMPLETE CBC W/AUTO DIFF WBC: CPT | Performed by: OBSTETRICS & GYNECOLOGY

## 2025-02-09 PROCEDURE — 86901 BLOOD TYPING SEROLOGIC RH(D): CPT | Performed by: OBSTETRICS & GYNECOLOGY

## 2025-02-09 PROCEDURE — 80053 COMPREHEN METABOLIC PANEL: CPT | Performed by: OBSTETRICS & GYNECOLOGY

## 2025-02-09 PROCEDURE — 84112 EVAL AMNIOTIC FLUID PROTEIN: CPT | Performed by: OBSTETRICS & GYNECOLOGY

## 2025-02-09 PROCEDURE — 86900 BLOOD TYPING SEROLOGIC ABO: CPT | Performed by: OBSTETRICS & GYNECOLOGY

## 2025-02-09 RX ORDER — DOCUSATE SODIUM 100 MG/1
100 CAPSULE, LIQUID FILLED ORAL 2 TIMES DAILY
Status: DISCONTINUED | OUTPATIENT
Start: 2025-02-09 | End: 2025-02-10 | Stop reason: HOSPADM

## 2025-02-09 RX ORDER — BUTORPHANOL TARTRATE 2 MG/ML
2 INJECTION, SOLUTION INTRAMUSCULAR; INTRAVENOUS
Status: DISCONTINUED | OUTPATIENT
Start: 2025-02-09 | End: 2025-02-09 | Stop reason: HOSPADM

## 2025-02-09 RX ORDER — CARBOPROST TROMETHAMINE 250 UG/ML
250 INJECTION, SOLUTION INTRAMUSCULAR
Status: DISCONTINUED | OUTPATIENT
Start: 2025-02-09 | End: 2025-02-09 | Stop reason: HOSPADM

## 2025-02-09 RX ORDER — ONDANSETRON 4 MG/1
4 TABLET, ORALLY DISINTEGRATING ORAL EVERY 6 HOURS PRN
Status: DISCONTINUED | OUTPATIENT
Start: 2025-02-09 | End: 2025-02-09 | Stop reason: HOSPADM

## 2025-02-09 RX ORDER — PHYTONADIONE 1 MG/.5ML
INJECTION, EMULSION INTRAMUSCULAR; INTRAVENOUS; SUBCUTANEOUS
Status: ACTIVE
Start: 2025-02-09 | End: 2025-02-10

## 2025-02-09 RX ORDER — OXYTOCIN/0.9 % SODIUM CHLORIDE 30/500 ML
125 PLASTIC BAG, INJECTION (ML) INTRAVENOUS ONCE AS NEEDED
Status: COMPLETED | OUTPATIENT
Start: 2025-02-09 | End: 2025-02-09

## 2025-02-09 RX ORDER — HYDROCORTISONE 25 MG/G
CREAM TOPICAL AS NEEDED
Status: DISCONTINUED | OUTPATIENT
Start: 2025-02-09 | End: 2025-02-10 | Stop reason: HOSPADM

## 2025-02-09 RX ORDER — METHYLERGONOVINE MALEATE 0.2 MG/ML
200 INJECTION INTRAVENOUS ONCE AS NEEDED
Status: DISCONTINUED | OUTPATIENT
Start: 2025-02-09 | End: 2025-02-09 | Stop reason: HOSPADM

## 2025-02-09 RX ORDER — BISACODYL 10 MG
10 SUPPOSITORY, RECTAL RECTAL DAILY PRN
Status: DISCONTINUED | OUTPATIENT
Start: 2025-02-10 | End: 2025-02-10 | Stop reason: HOSPADM

## 2025-02-09 RX ORDER — OXYTOCIN/0.9 % SODIUM CHLORIDE 30/500 ML
125 PLASTIC BAG, INJECTION (ML) INTRAVENOUS ONCE AS NEEDED
Status: DISCONTINUED | OUTPATIENT
Start: 2025-02-09 | End: 2025-02-10 | Stop reason: HOSPADM

## 2025-02-09 RX ORDER — OXYTOCIN/0.9 % SODIUM CHLORIDE 30/500 ML
250 PLASTIC BAG, INJECTION (ML) INTRAVENOUS CONTINUOUS
Status: ACTIVE | OUTPATIENT
Start: 2025-02-09 | End: 2025-02-09

## 2025-02-09 RX ORDER — FENTANYL/ROPIVACAINE/NS/PF 2MCG/ML-.2
10 PLASTIC BAG, INJECTION (ML) INJECTION CONTINUOUS
Status: DISCONTINUED | OUTPATIENT
Start: 2025-02-09 | End: 2025-02-10

## 2025-02-09 RX ORDER — ONDANSETRON 2 MG/ML
4 INJECTION INTRAMUSCULAR; INTRAVENOUS EVERY 6 HOURS PRN
Status: DISCONTINUED | OUTPATIENT
Start: 2025-02-09 | End: 2025-02-09 | Stop reason: HOSPADM

## 2025-02-09 RX ORDER — ONDANSETRON 2 MG/ML
4 INJECTION INTRAMUSCULAR; INTRAVENOUS ONCE AS NEEDED
Status: COMPLETED | OUTPATIENT
Start: 2025-02-09 | End: 2025-02-09

## 2025-02-09 RX ORDER — HYDROCODONE BITARTRATE AND ACETAMINOPHEN 5; 325 MG/1; MG/1
1 TABLET ORAL EVERY 4 HOURS PRN
Status: DISCONTINUED | OUTPATIENT
Start: 2025-02-09 | End: 2025-02-10

## 2025-02-09 RX ORDER — OXYTOCIN/0.9 % SODIUM CHLORIDE 30/500 ML
0-20 PLASTIC BAG, INJECTION (ML) INTRAVENOUS
Status: DISCONTINUED | OUTPATIENT
Start: 2025-02-09 | End: 2025-02-10

## 2025-02-09 RX ORDER — SODIUM CHLORIDE, SODIUM LACTATE, POTASSIUM CHLORIDE, CALCIUM CHLORIDE 600; 310; 30; 20 MG/100ML; MG/100ML; MG/100ML; MG/100ML
125 INJECTION, SOLUTION INTRAVENOUS CONTINUOUS
Status: DISCONTINUED | OUTPATIENT
Start: 2025-02-09 | End: 2025-02-10

## 2025-02-09 RX ORDER — TERBUTALINE SULFATE 1 MG/ML
0.25 INJECTION, SOLUTION SUBCUTANEOUS AS NEEDED
Status: DISCONTINUED | OUTPATIENT
Start: 2025-02-09 | End: 2025-02-09 | Stop reason: HOSPADM

## 2025-02-09 RX ORDER — IBUPROFEN 600 MG/1
600 TABLET, FILM COATED ORAL EVERY 6 HOURS PRN
Status: DISCONTINUED | OUTPATIENT
Start: 2025-02-09 | End: 2025-02-10 | Stop reason: HOSPADM

## 2025-02-09 RX ORDER — FAMOTIDINE 20 MG/1
20 TABLET, FILM COATED ORAL 2 TIMES DAILY PRN
Status: DISCONTINUED | OUTPATIENT
Start: 2025-02-09 | End: 2025-02-09 | Stop reason: HOSPADM

## 2025-02-09 RX ORDER — PRENATAL VIT/IRON FUM/FOLIC AC 27MG-0.8MG
1 TABLET ORAL DAILY
Status: DISCONTINUED | OUTPATIENT
Start: 2025-02-09 | End: 2025-02-10 | Stop reason: HOSPADM

## 2025-02-09 RX ORDER — MAGNESIUM CARB/ALUMINUM HYDROX 105-160MG
30 TABLET,CHEWABLE ORAL ONCE AS NEEDED
Status: DISCONTINUED | OUTPATIENT
Start: 2025-02-09 | End: 2025-02-09 | Stop reason: HOSPADM

## 2025-02-09 RX ORDER — MISOPROSTOL 200 UG/1
800 TABLET ORAL ONCE AS NEEDED
Status: DISCONTINUED | OUTPATIENT
Start: 2025-02-09 | End: 2025-02-09 | Stop reason: HOSPADM

## 2025-02-09 RX ORDER — TEMAZEPAM 7.5 MG/1
7.5 CAPSULE ORAL NIGHTLY PRN
Status: DISCONTINUED | OUTPATIENT
Start: 2025-02-09 | End: 2025-02-10 | Stop reason: HOSPADM

## 2025-02-09 RX ORDER — SODIUM CHLORIDE 0.9 % (FLUSH) 0.9 %
10 SYRINGE (ML) INJECTION AS NEEDED
Status: DISCONTINUED | OUTPATIENT
Start: 2025-02-09 | End: 2025-02-09 | Stop reason: HOSPADM

## 2025-02-09 RX ORDER — ONDANSETRON 4 MG/1
4 TABLET, ORALLY DISINTEGRATING ORAL EVERY 8 HOURS PRN
Status: DISCONTINUED | OUTPATIENT
Start: 2025-02-09 | End: 2025-02-10 | Stop reason: HOSPADM

## 2025-02-09 RX ORDER — EPHEDRINE SULFATE 50 MG/ML
5 INJECTION, SOLUTION INTRAVENOUS
Status: DISCONTINUED | OUTPATIENT
Start: 2025-02-09 | End: 2025-02-09 | Stop reason: HOSPADM

## 2025-02-09 RX ORDER — ACETAMINOPHEN 325 MG/1
650 TABLET ORAL EVERY 4 HOURS PRN
Status: DISCONTINUED | OUTPATIENT
Start: 2025-02-09 | End: 2025-02-09 | Stop reason: HOSPADM

## 2025-02-09 RX ORDER — SODIUM CHLORIDE 0.9 % (FLUSH) 0.9 %
1-10 SYRINGE (ML) INJECTION AS NEEDED
Status: DISCONTINUED | OUTPATIENT
Start: 2025-02-09 | End: 2025-02-10 | Stop reason: HOSPADM

## 2025-02-09 RX ORDER — OXYTOCIN/0.9 % SODIUM CHLORIDE 30/500 ML
999 PLASTIC BAG, INJECTION (ML) INTRAVENOUS ONCE
Status: DISCONTINUED | OUTPATIENT
Start: 2025-02-09 | End: 2025-02-09 | Stop reason: HOSPADM

## 2025-02-09 RX ORDER — HYDROCODONE BITARTRATE AND ACETAMINOPHEN 10; 325 MG/1; MG/1
1 TABLET ORAL EVERY 4 HOURS PRN
Status: DISCONTINUED | OUTPATIENT
Start: 2025-02-09 | End: 2025-02-10

## 2025-02-09 RX ORDER — SODIUM CHLORIDE 9 MG/ML
40 INJECTION, SOLUTION INTRAVENOUS AS NEEDED
Status: DISCONTINUED | OUTPATIENT
Start: 2025-02-09 | End: 2025-02-09 | Stop reason: HOSPADM

## 2025-02-09 RX ORDER — ACETAMINOPHEN 325 MG/1
650 TABLET ORAL EVERY 6 HOURS PRN
Status: DISCONTINUED | OUTPATIENT
Start: 2025-02-09 | End: 2025-02-10

## 2025-02-09 RX ORDER — ONDANSETRON 2 MG/ML
4 INJECTION INTRAMUSCULAR; INTRAVENOUS EVERY 6 HOURS PRN
Status: DISCONTINUED | OUTPATIENT
Start: 2025-02-09 | End: 2025-02-10 | Stop reason: HOSPADM

## 2025-02-09 RX ORDER — SODIUM CHLORIDE 0.9 % (FLUSH) 0.9 %
10 SYRINGE (ML) INJECTION EVERY 12 HOURS SCHEDULED
Status: DISCONTINUED | OUTPATIENT
Start: 2025-02-09 | End: 2025-02-09 | Stop reason: HOSPADM

## 2025-02-09 RX ORDER — ERYTHROMYCIN 5 MG/G
OINTMENT OPHTHALMIC
Status: ACTIVE
Start: 2025-02-09 | End: 2025-02-10

## 2025-02-09 RX ORDER — FAMOTIDINE 10 MG/ML
20 INJECTION, SOLUTION INTRAVENOUS 2 TIMES DAILY PRN
Status: DISCONTINUED | OUTPATIENT
Start: 2025-02-09 | End: 2025-02-09 | Stop reason: HOSPADM

## 2025-02-09 RX ORDER — TRANEXAMIC ACID 10 MG/ML
1000 INJECTION, SOLUTION INTRAVENOUS ONCE AS NEEDED
Status: DISCONTINUED | OUTPATIENT
Start: 2025-02-09 | End: 2025-02-10

## 2025-02-09 RX ADMIN — Medication: at 18:56

## 2025-02-09 RX ADMIN — SODIUM CHLORIDE, POTASSIUM CHLORIDE, SODIUM LACTATE AND CALCIUM CHLORIDE 1000 ML: 600; 310; 30; 20 INJECTION, SOLUTION INTRAVENOUS at 07:01

## 2025-02-09 RX ADMIN — ONDANSETRON 4 MG: 2 INJECTION, SOLUTION INTRAMUSCULAR; INTRAVENOUS at 14:45

## 2025-02-09 RX ADMIN — Medication 125 ML/HR: at 14:57

## 2025-02-09 RX ADMIN — Medication 2 MILLI-UNITS/MIN: at 10:36

## 2025-02-09 RX ADMIN — LIDOCAINE HYDROCHLORIDE 3 ML: 10; .005 INJECTION, SOLUTION EPIDURAL; INFILTRATION; INTRACAUDAL; PERINEURAL at 07:34

## 2025-02-09 RX ADMIN — SODIUM CHLORIDE, POTASSIUM CHLORIDE, SODIUM LACTATE AND CALCIUM CHLORIDE 125 ML/HR: 600; 310; 30; 20 INJECTION, SOLUTION INTRAVENOUS at 07:57

## 2025-02-09 NOTE — PLAN OF CARE
Goal Outcome Evaluation:      VS stable. Fundus firm at umbilicus. No excessive lochia. Utilized . + bonding with . Nursed well in recovery. CMP and CBC ordered for am.

## 2025-02-09 NOTE — OBED NOTES
[facility]  Leeroy Cunningham  : 1995  MRN: [id number]  CSN: [id number]    OB ED Provider Note    Subjective   Chief Complaint   Patient presents with    Contractions     Since 0200 this am. Reports waking up with wet underwear as well. Denies vaginal bleeding bleeding.     Leeroy Cunningham is a 29 y.o. year old  with an Estimated Date of Delivery: 25 currently at 38w6d presenting with CTX every several minutes since 0200 with loss of clear fluid since.She denies VB. FM is present. History obtained through [facility].    Prenatal care has been with Dr. Molina.  It has been complicated by previous IUFD .    OB History    Para Term  AB Living   2 1 1 0 0 0   SAB IAB Ectopic Molar Multiple Live Births   0 0 0 0 0 0      # Outcome Date GA Lbr Adonis/2nd Weight Sex Type Anes PTL Lv   2 Current            1 Term     U Vaginal unsp   FD      Complications: Preeclampsia     Past Medical History:   Diagnosis Date    Dizziness     Preeclampsia     with prior pregnancy     No past surgical history on file.    Current Facility-Administered Medications:     acetaminophen (TYLENOL) tablet 650 mg, 650 mg, Oral, Q4H PRN, Cathleen León MD    butorphanol (STADOL) injection 2 mg, 2 mg, Intravenous, Q3H PRN, Cathleen León MD    carboprost (HEMABATE) injection 250 mcg, 250 mcg, Intramuscular, Q15 Min PRN, Cathleen León MD    famotidine (PEPCID) injection 20 mg, 20 mg, Intravenous, BID PRN **OR** famotidine (PEPCID) tablet 20 mg, 20 mg, Oral, BID PRN, Cathleen León MD    lactated ringers bolus 1,000 mL, 1,000 mL, Intravenous, Once PRN, Cathleen León MD    lactated ringers infusion, 125 mL/hr, Intravenous, Continuous, Cathleen León MD    methylergonovine (METHERGINE) injection 200 mcg, 200 mcg, Intramuscular, Once PRN, Cathleen León MD    mineral oil liquid 30 mL, 30 mL, Topical, Once PRN, Cathleen León MD    miSOPROStol  (CYTOTEC) tablet 800 mcg, 800 mcg, Rectal, Once PRN, Cathleen León MD    ondansetron ODT (ZOFRAN-ODT) disintegrating tablet 4 mg, 4 mg, Oral, Q6H PRN **OR** ondansetron (ZOFRAN) injection 4 mg, 4 mg, Intravenous, Q6H PRN, Cathleen León MD    oxytocin (PITOCIN) 30 units in 0.9% sodium chloride 500 mL (premix), 999 mL/hr, Intravenous, Once **FOLLOWED BY** oxytocin (PITOCIN) 30 units in 0.9% sodium chloride 500 mL (premix), 250 mL/hr, Intravenous, Continuous, Cathleen León MD    sodium chloride 0.9 % flush 10 mL, 10 mL, Intravenous, Q12H, Cathleen León MD    sodium chloride 0.9 % flush 10 mL, 10 mL, Intravenous, PRN, Cathleen León MD    sodium chloride 0.9 % infusion 40 mL, 40 mL, Intravenous, PRN, Cathleen León MD    terbutaline (BRETHINE) injection 0.25 mg, 0.25 mg, Subcutaneous, PRN, Cathleen León MD    tranexamic acid 1000 mg in 100 mL 0.7% NaCl infusion (premix), 1,000 mg, Intravenous, Once PRN, Cathleen León MD    No Known Allergies  Social History    Tobacco Use      Smoking status: Never      Smokeless tobacco: Never    Review of Systems   Gastrointestinal:  Positive for abdominal pain.   Genitourinary:  Positive for vaginal discharge.   All other systems reviewed and are negative.        Objective   Wt 69.5 kg (153 lb 3.2 oz)   LMP 06/12/2024 (Approximate)   General: well developed; well nourished  mentation appropriate  moderately distressed   Abdomen: soft, non-tender; no masses  gravid    FHT's: reactive and category 1      Cervix: was checked (by RN): 5.5 cm / 70 % / -2   Presentation: cephalic   Contractions: every 4 minutes   Chest: Unlabored respirations    CV:  Mild tachycardia, RR   Ext:   No C/C/E   Back: CVA tenderness is deferred bilateral        Prenatal Labs  Lab Results   Component Value Date    HGB 10.5 (L) 01/09/2025    RUBELLAABIGG <0.90 (L) 09/10/2024    HEPBSAG Negative 09/10/2024    ABSCRN Negative 09/10/2024    BNQ9NSE9 Non Reactive 09/10/2024     "HEPCVIRUSABY Negative 09/10/2024     12/05/2024    STREPGPB Negative 01/23/2025    URINECX Final report 09/10/2024    CHLAMNAA Negative 09/10/2024    NGONORRHON Negative 09/10/2024       Current Labs Reviewed   UA:  No results found for: \"SQUAMEPIUA\", \"SPECGRAVUR\", \"KETONESU\", \"BLOODU\", \"LEUKOCYTESUR\", \"NITRITEU\", \"RBCUA\", \"WBCUA\", \"BACTERIA\"       Assessment   IUP at 38w6d  Active labor- GBS negative. She would like an epidural.     Plan   Admit to L&D for anticipated delivery.  notified and is assuming management.    Darrin Mary MD  2/9/2025  06:55 EST     "

## 2025-02-09 NOTE — ANESTHESIA POSTPROCEDURE EVALUATION
Patient: Leeroy Cunningham    Procedure Summary       Date: 02/09/25 Room / Location:     Anesthesia Start: 0725 Anesthesia Stop: 1339    Procedure: LABOR ANALGESIA Diagnosis:     Scheduled Providers:  Provider: Yulisa Beckwith MD    Anesthesia Type: epidural ASA Status: 2            Anesthesia Type: epidural    Vitals  Vitals Value Taken Time   /60 02/09/25 1446   Temp 37.6 °C (99.6 °F) 02/09/25 1341   Pulse 74 02/09/25 1446   Resp 16 02/09/25 1445   SpO2 100 % 02/09/25 1335           Post Anesthesia Care and Evaluation    Anesthetic complications: No anesthetic complications

## 2025-02-09 NOTE — ANESTHESIA PREPROCEDURE EVALUATION
Anesthesia Evaluation     NPO Solid Status: > 8 hours  NPO Liquid Status: > 2 hours           Airway   Mallampati: II  TM distance: >3 FB  Neck ROM: full  Dental - normal exam     Pulmonary    Cardiovascular     Rhythm: regular  Rate: normal    (+) hypertension      Neuro/Psych  GI/Hepatic/Renal/Endo      Musculoskeletal     Abdominal    Substance History      OB/GYN    (+) Pregnant, pregnancy induced hypertension        Other                    Anesthesia Plan    ASA 2     epidural     (IUP at 38w6d for labor epidural )    Anesthetic plan, risks, benefits, and alternatives have been provided, discussed and informed consent has been obtained with: patient and spouse/significant other ( used).  Pre-procedure education provided    CODE STATUS:    Level Of Support Discussed With: Patient  Code Status (Patient has no pulse and is not breathing): CPR (Attempt to Resuscitate)  Medical Interventions (Patient has pulse or is breathing): Full Support

## 2025-02-09 NOTE — PLAN OF CARE
Goal Outcome Evaluation:  Plan of Care Reviewed With: patient, family        Progress: improving  Outcome Evaluation: 38.6 Spontaneous onset of labor.  at 1339,baby girl apgars 9,9. . First degree tear repaired. Pt reports no pain at this time. Fundus firm, midline, U/2. Scant bleeding noted.Pt in MAXIMILIAN with infant. Plan to move patient and baby to postpartum in two hours if bleeding and vitals remain stable.

## 2025-02-09 NOTE — ANESTHESIA PROCEDURE NOTES
Labor Epidural      Patient reassessed immediately prior to procedure    Patient location during procedure: OB  Start Time: 2/9/2025 7:29 AM  Stop Time: 2/9/2025 7:34 AM  Performed By  Anesthesiologist: Yulisa Beckwith MD  Preanesthetic Checklist  Completed: patient identified, IV checked, site marked, risks and benefits discussed, surgical consent, monitors and equipment checked, pre-op evaluation and timeout performed  Prep:  Pt Position:sitting  Sterile Tech:cap, gloves and mask  Prep:chlorhexidine gluconate and isopropyl alcohol  Monitoring:blood pressure monitoring and continuous pulse oximetry  Epidural Block Procedure:  Approach:midline  Guidance:landmark technique and palpation technique  Location:L3-L4  Needle Type:Tuohy  Needle Gauge:19 G  Loss of Resistance Medium: air (Mixed air and saline)  Loss of Resistance: 4cm  Cath Depth at skin:9 cm  Paresthesia: none  Aspiration:negative  Test Dose:negative  Number of Attempts: 1  Post Assessment:  Dressing:occlusive dressing applied and secured with tape  Pt Tolerance:patient tolerated the procedure well with no apparent complications  Complications:no

## 2025-02-09 NOTE — L&D DELIVERY NOTE
DELIVERY REPORT    [facility]  Patient: Leeroy Cunningham  : 1995  Age: 29 y.o.  Unit Number: [id number]    DATE OF DELIVERY: 2025    PREOPERATIVE DIAGNOSIS:  1)  Intrauterine Pregnancy at 39 weeks.  2)  History of Intrauterine Fetal Demise.  3)  Large for Gestational Age.    POSTOPERATIVE DIAGNOSIS:  Same as pre-op diagnosis    PROCEDURE PERFORMED:   Spontaneous Vaginal Delivery    SURGEON: Trey Armenta MD    ASSISTANT:   None    ANESTHESIA:   Epidural    ESTIMATED BLOOD LOSS:   500 ml    FINDINGS:     Live Viable Female Infant //  Weight  8 lbs  3 oz // Apgar 1 minute 9 and Apgar 5 minutes 9             Normal placenta and cord                        1st Degree laceration    DESCRIPTION OF PROCEDURE: Patient is a 29 year old G2 with previous history of intrauterine fetal demise who presented in active labor at 38 weeks.  She had reassuring fetal tracing and received an epidural for pain control.  Patient had spontaneous rupture of membranes and progress along a normal labor curve.  She required some pitocin for augmentation.  An intrauterine pressure catheter was placed.  She reached the second stage of labor and began pushing.  She pushed for less than 30 minutes and delivery team was assembled.  Perineum was prepped with betadine and patient continued to push.  With continued pushing, she delivered a live viable female infant in the occiput anterior position.  The nose and mouth were bulb suctioned upon presentation of the head.  With continued pushing, the shoulders were not direct anterior/posterior position.  With palpation of the anterior shoulder during pushing, the shoulder was guided thru the introitus, reducing the posterior shoulder.  The remainder of the torso and body were delivered without difficulty.  Infant was vigorous, delayed cord clamping occurred, the cord was cut and infant was presented to family.  Cord blood was obtained and placenta was delivered  spontaneously intact with 3 vessel cord was noted.  A first degree vaginal laceration was repaired with 3-0 Monocryl without difficulty.  Pitocin was given for prevention of uterine atony.    COMPLICATIONS: None  SPECIMEN:  Cord blood  DISPOSITION:  Mother and baby remained in LDR in stable condition       Trey Armenta MD  Completed: 2/9/2025

## 2025-02-10 ENCOUNTER — PATIENT OUTREACH (OUTPATIENT)
Dept: LABOR AND DELIVERY | Facility: HOSPITAL | Age: 30
End: 2025-02-10
Payer: MEDICAID

## 2025-02-10 VITALS
HEART RATE: 93 BPM | SYSTOLIC BLOOD PRESSURE: 103 MMHG | RESPIRATION RATE: 16 BRPM | DIASTOLIC BLOOD PRESSURE: 68 MMHG | BODY MASS INDEX: 30.08 KG/M2 | WEIGHT: 153.2 LBS | OXYGEN SATURATION: 100 % | TEMPERATURE: 98.3 F | HEIGHT: 60 IN

## 2025-02-10 LAB
ALBUMIN SERPL-MCNC: 2.7 G/DL (ref 3.5–5.2)
ALBUMIN/GLOB SERPL: 0.9 G/DL
ALP SERPL-CCNC: 179 U/L (ref 39–117)
ALT SERPL W P-5'-P-CCNC: 96 U/L (ref 1–33)
ANION GAP SERPL CALCULATED.3IONS-SCNC: 6.2 MMOL/L (ref 5–15)
AST SERPL-CCNC: 49 U/L (ref 1–32)
BASOPHILS # BLD AUTO: 0.03 10*3/MM3 (ref 0–0.2)
BASOPHILS NFR BLD AUTO: 0.3 % (ref 0–1.5)
BILIRUB SERPL-MCNC: 0.6 MG/DL (ref 0–1.2)
BUN SERPL-MCNC: 8 MG/DL (ref 6–20)
BUN/CREAT SERPL: 13.8 (ref 7–25)
CALCIUM SPEC-SCNC: 8.6 MG/DL (ref 8.6–10.5)
CHLORIDE SERPL-SCNC: 108 MMOL/L (ref 98–107)
CO2 SERPL-SCNC: 22.8 MMOL/L (ref 22–29)
CREAT SERPL-MCNC: 0.58 MG/DL (ref 0.57–1)
DEPRECATED RDW RBC AUTO: 42.7 FL (ref 37–54)
EGFRCR SERPLBLD CKD-EPI 2021: 126.6 ML/MIN/1.73
EOSINOPHIL # BLD AUTO: 0.1 10*3/MM3 (ref 0–0.4)
EOSINOPHIL NFR BLD AUTO: 0.9 % (ref 0.3–6.2)
ERYTHROCYTE [DISTWIDTH] IN BLOOD BY AUTOMATED COUNT: 13 % (ref 12.3–15.4)
GLOBULIN UR ELPH-MCNC: 3.1 GM/DL
GLUCOSE SERPL-MCNC: 91 MG/DL (ref 65–99)
HAV IGM SERPL QL IA: NORMAL
HBV CORE IGM SERPL QL IA: NORMAL
HBV SURFACE AG SERPL QL IA: NORMAL
HCT VFR BLD AUTO: 27.4 % (ref 34–46.6)
HCV AB SER QL: NORMAL
HGB BLD-MCNC: 9.6 G/DL (ref 12–15.9)
IMM GRANULOCYTES # BLD AUTO: 0.05 10*3/MM3 (ref 0–0.05)
IMM GRANULOCYTES NFR BLD AUTO: 0.4 % (ref 0–0.5)
LYMPHOCYTES # BLD AUTO: 2.41 10*3/MM3 (ref 0.7–3.1)
LYMPHOCYTES NFR BLD AUTO: 21.7 % (ref 19.6–45.3)
MCH RBC QN AUTO: 32.2 PG (ref 26.6–33)
MCHC RBC AUTO-ENTMCNC: 35 G/DL (ref 31.5–35.7)
MCV RBC AUTO: 91.9 FL (ref 79–97)
MONOCYTES # BLD AUTO: 1.18 10*3/MM3 (ref 0.1–0.9)
MONOCYTES NFR BLD AUTO: 10.6 % (ref 5–12)
NEUTROPHILS NFR BLD AUTO: 66.1 % (ref 42.7–76)
NEUTROPHILS NFR BLD AUTO: 7.35 10*3/MM3 (ref 1.7–7)
NRBC BLD AUTO-RTO: 0 /100 WBC (ref 0–0.2)
PLATELET # BLD AUTO: 294 10*3/MM3 (ref 140–450)
PMV BLD AUTO: 9.9 FL (ref 6–12)
POTASSIUM SERPL-SCNC: 4.1 MMOL/L (ref 3.5–5.2)
PROT SERPL-MCNC: 5.8 G/DL (ref 6–8.5)
RBC # BLD AUTO: 2.98 10*6/MM3 (ref 3.77–5.28)
SODIUM SERPL-SCNC: 137 MMOL/L (ref 136–145)
WBC NRBC COR # BLD AUTO: 11.12 10*3/MM3 (ref 3.4–10.8)

## 2025-02-10 PROCEDURE — 80074 ACUTE HEPATITIS PANEL: CPT | Performed by: OBSTETRICS & GYNECOLOGY

## 2025-02-10 PROCEDURE — 80053 COMPREHEN METABOLIC PANEL: CPT | Performed by: OBSTETRICS & GYNECOLOGY

## 2025-02-10 PROCEDURE — 85025 COMPLETE CBC W/AUTO DIFF WBC: CPT | Performed by: OBSTETRICS & GYNECOLOGY

## 2025-02-10 RX ORDER — PSEUDOEPHEDRINE HCL 30 MG
100 TABLET ORAL 2 TIMES DAILY
Qty: 15 CAPSULE | Refills: 0 | Status: SHIPPED | OUTPATIENT
Start: 2025-02-10

## 2025-02-10 RX ORDER — IBUPROFEN 600 MG/1
600 TABLET, FILM COATED ORAL EVERY 6 HOURS PRN
Qty: 90 TABLET | Refills: 1 | Status: SHIPPED | OUTPATIENT
Start: 2025-02-10

## 2025-02-10 RX ORDER — TRAMADOL HYDROCHLORIDE 50 MG/1
50 TABLET ORAL EVERY 6 HOURS PRN
Status: DISCONTINUED | OUTPATIENT
Start: 2025-02-10 | End: 2025-02-10 | Stop reason: HOSPADM

## 2025-02-10 RX ADMIN — DOCUSATE SODIUM 100 MG: 100 CAPSULE, LIQUID FILLED ORAL at 08:40

## 2025-02-10 RX ADMIN — MUPIROCIN: 20 OINTMENT TOPICAL at 18:01

## 2025-02-10 RX ADMIN — PRENATAL VITAMINS-IRON FUMARATE 27 MG IRON-FOLIC ACID 0.8 MG TABLET 1 TABLET: at 08:40

## 2025-02-10 NOTE — OUTREACH NOTE
[facility]  IP Postpartum    Questions/Answers      Flowsheet Row Responses   Best Method for Contacting Cell   Support Person Present Yes   Does the patient have a car seat at the hospital Yes   Delivery Note Reviewed Reviewed   Were birth expectations met? Yes   Is there a need for additional support/resources? No   Lactation Note Reviewed Reviewed  [spoke w lactation rn, she will process insurance for breast pump and give to pt.]   Is additional support needed? No   Any questions or concerns? No   Is the patient going to use Meds to Beds? Yes   Any concerns related discharge meds/ability to  prescriptions? No   Confirm initial well-child Pediatrician appointment date/time: --  [knows to schedule]   Additional post-discharge F/U appointments --  [knows to schedule]   Does patient have transportation to appointments? Yes   Any other assistance needed to ensure she is able to attend appointments? No   Does patient have supplies needed at home for  care? Breast Pump, Clothing, Crib, Diapers          Pt doing well today, no c/o.  MNN spoke w lactation rn, she will bring pump to pt.  Pt doing well on PP unit.  Has all baby supplies.  [facility] has [facility] pt today.  PP Maternal warning s/s reviewed, pt verbalized understanding.  Encouraged to reach out with any needs.  Will follow up again PP.  Sent PP packet.     [id number] used.    Elio Montiel RN  Maternity Nurse Navigator    2/10/2025, 17:41 EST

## 2025-02-10 NOTE — PAYOR COMM NOTE
"Leeroy Cunningham (29 y.o. Female)       Date of Birth   1995    Social Security Number       Address   [address] [place] [address]    Home Phone   [phone]    MRN   [id number]       Nondenominational   None    Marital Status   Single                            Admission Date   2/9/25    Admission Type   Emergency    Admitting Provider   Cathleen León MD    Attending Provider   Lewis Molina MD    Department, Room/Bed   [address], [address]/1       Discharge Date       Discharge Disposition   Home or Self Care    Discharge Destination                                 Attending Provider: Lewis Molina MD    Allergies: No Known Allergies    Isolation: None   Infection: None   Code Status: CPR    Ht: 153 cm (60.24\")   Wt: 69.5 kg (153 lb 3.2 oz)    Admission Cmt: None   Principal Problem: Pregnancy [Z34.90]                   Active Insurance as of 2/9/2025       Primary Coverage       Payor Plan Insurance Group Employer/Plan Group    [facility] COMMUNITY PLAN [facility] PLAN [facility]       Payor Plan Address Payor Plan Phone Number Payor Plan Fax Number Effective Dates    [address]   8/1/2024 - None Entered    [facility] [phone]         Subscriber Name Subscriber Birth Date Member ID       LEEROY CUNNINGHAM 1995 [id number]                     Emergency Contacts        (Rel.) Home Phone Work Phone Mobile Phone    DEAN MATAMOROS (Relative) -- -- [phone]              Insurance Information                  [facility] Phone: --    Subscriber: Leeroy Cunningham Subscriber#: [id number]    Group#: [id number] Precert#: --    Authorization#: -- Effective Date: --          Problem List             Codes Noted - Resolved       Hospital    * (Principal) Pregnancy ICD-10-CM: Z34.90  ICD-9-CM: V22.2 2/9/2025 - Present     History & Physical    No notes of this type exist for this " encounter.       Facility-Administered Medications as of 2/10/2025   Medication Dose Route Frequency Provider Last Rate Last Admin    all purpose nipple ointment   Topical 4x Daily PRN Trey Armenta MD        benzocaine (AMERICAINE) 20 % rectal ointment   Rectal PRN Trey Armenta MD        benzocaine-menthol (DERMOPLAST) 20-0.5 % topical spray   Topical PRN Trey Armenta MD   Given at 25 1856    bisacodyl (DULCOLAX) suppository 10 mg  10 mg Rectal Daily PRN Trey Armenta MD        docusate sodium (COLACE) capsule 100 mg  100 mg Oral BID Trey Armenta MD   100 mg at 02/10/25 0840    [] erythromycin (ROMYCIN) 5 MG/GM ophthalmic ointment  - ADS Override Pull             Hydrocort-Pramoxine (Perianal) (PROCTOFOAM-HS) 1-1 % rectal foam 1 Application  1 Application Topical PRN Trey Armenta MD        Hydrocortisone (Perianal) (ANUSOL-HC) 2.5 % rectal cream   Rectal PRN Trey Armenta MD        ibuprofen (ADVIL,MOTRIN) tablet 600 mg  600 mg Oral Q6H PRN Trey Armenta MD        [] lactated ringers bolus 1,000 mL  1,000 mL Intravenous Once PRN Cathleen León MD 1,000 mL/hr at 25 0701 1,000 mL at 25 0701    magnesium hydroxide (MILK OF MAGNESIA) suspension 10 mL  10 mL Oral Daily PRN Trey Armenta MD        Measles, Mumps & Rubella Vac (MMR) injection 0.5 mL  0.5 mL Subcutaneous During Hospitalization Isabella Sargent APRN        [COMPLETED] ondansetron (ZOFRAN) injection 4 mg  4 mg Intravenous Once PRN Yulisa Beckwith MD   4 mg at 25 1445    ondansetron (ZOFRAN) injection 4 mg  4 mg Intravenous Q6H PRN Trey Armenta MD        ondansetron ODT (ZOFRAN-ODT) disintegrating tablet 4 mg  4 mg Oral Q8H PRN Trey Armenta MD        [] oxytocin (PITOCIN) 30 units in 0.9% sodium chloride 500 mL (premix)  250 mL/hr Intravenous Continuous Cathleen León MD   Stopped  at 25 1457    [COMPLETED] oxytocin (PITOCIN) 30 units in 0.9% sodium chloride 500 mL (premix)  125 mL/hr Intravenous Once PRN Trey Armenta  mL/hr at 25 1457 125 mL/hr at 25 1457    oxytocin (PITOCIN) 30 units in 0.9% sodium chloride 500 mL (premix)  125 mL/hr Intravenous Once PRN Trey Armenta MD        [] phytonadione (VITAMIN K) 1 MG/0.5ML injection  - ADS Override Pull             prenatal vitamin tablet 1 tablet  1 tablet Oral Daily Trey Armenta MD   1 tablet at 02/10/25 0840    sodium chloride 0.9 % flush 1-10 mL  1-10 mL Intravenous PRN Trey Armenta MD        temazepam (RESTORIL) capsule 7.5 mg  7.5 mg Oral Nightly PRN Trey Armenta MD        traMADol (ULTRAM) tablet 50 mg  50 mg Oral Q6H PRN Deric Muhammad MD         Lab Results (last 72 hours)       Procedure Component Value Units Date/Time    Comprehensive Metabolic Panel [[id number]]  (Abnormal) Collected: 02/10/25 0555    Specimen: Blood Updated: 02/10/25 0648     Glucose 91 mg/dL      BUN 8 mg/dL      Creatinine 0.58 mg/dL      Sodium 137 mmol/L      Potassium 4.1 mmol/L      Chloride 108 mmol/L      CO2 22.8 mmol/L      Calcium 8.6 mg/dL      Total Protein 5.8 g/dL      Albumin 2.7 g/dL      ALT (SGPT) 96 U/L      AST (SGOT) 49 U/L      Alkaline Phosphatase 179 U/L      Total Bilirubin 0.6 mg/dL      Globulin 3.1 gm/dL      A/G Ratio 0.9 g/dL      BUN/Creatinine Ratio 13.8     Anion Gap 6.2 mmol/L      eGFR 126.6 mL/min/1.73     Narrative:      GFR Categories in Chronic Kidney Disease (CKD)      GFR Category          GFR (mL/min/1.73)    Interpretation  G1                     90 or greater         Normal or high (1)  G2                      60-89                Mild decrease (1)  G3a                   45-59                Mild to moderate decrease  G3b                   30-44                Moderate to severe decrease  G4                    15-29                 Severe decrease  G5                    14 or less           Kidney failure          (1)In the absence of evidence of kidney disease, neither GFR category G1 or G2 fulfill the criteria for CKD.    eGFR calculation 2021 CKD-EPI creatinine equation, which does not include race as a factor    CBC & Differential [[id number]]  (Abnormal) Collected: 02/10/25 0555    Specimen: Blood Updated: 02/10/25 0631    Narrative:      The following orders were created for panel order CBC & Differential.  Procedure                               Abnormality         Status                     ---------                               -----------         ------                     CBC Auto Differential[[id number]]        Abnormal            Final result                 Please view results for these tests on the individual orders.    CBC Auto Differential [[id number]]  (Abnormal) Collected: 02/10/25 0555    Specimen: Blood Updated: 02/10/25 0631     WBC 11.12 10*3/mm3      RBC 2.98 10*6/mm3      Hemoglobin 9.6 g/dL      Hematocrit 27.4 %      MCV 91.9 fL      MCH 32.2 pg      MCHC 35.0 g/dL      RDW 13.0 %      RDW-SD 42.7 fl      MPV 9.9 fL      Platelets 294 10*3/mm3      Neutrophil % 66.1 %      Lymphocyte % 21.7 %      Monocyte % 10.6 %      Eosinophil % 0.9 %      Basophil % 0.3 %      Immature Grans % 0.4 %      Neutrophils, Absolute 7.35 10*3/mm3      Lymphocytes, Absolute 2.41 10*3/mm3      Monocytes, Absolute 1.18 10*3/mm3      Eosinophils, Absolute 0.10 10*3/mm3      Basophils, Absolute 0.03 10*3/mm3      Immature Grans, Absolute 0.05 10*3/mm3      nRBC 0.0 /100 WBC     Protein / Creatinine Ratio, Urine - Urine, Clean Catch [[id number]]  (Abnormal) Collected: 02/09/25 0826    Specimen: Urine, Clean Catch Updated: 02/09/25 0910     Protein/Creatinine Ratio, Urine 202.3 mg/G Crea      Creatinine, Urine 59.8 mg/dL      Total Protein, Urine 12.1 mg/dL     Treponema pallidum AB w/Reflex RPR [[id number]]  (Normal) Collected: 02/09/25  0658    Specimen: Blood Updated: 02/09/25 0734     Treponemal AB Total Non-Reactive    Narrative:      Reactive results will reflex RPR testing.    Comprehensive Metabolic Panel [[id number]]  (Abnormal) Collected: 02/09/25 0658    Specimen: Blood Updated: 02/09/25 0732     Glucose 87 mg/dL      BUN 15 mg/dL      Creatinine 0.62 mg/dL      Sodium 135 mmol/L      Potassium 3.7 mmol/L      Chloride 102 mmol/L      CO2 20.0 mmol/L      Calcium 9.4 mg/dL      Total Protein 7.4 g/dL      Albumin 3.6 g/dL      ALT (SGPT) 123 U/L      AST (SGOT) 55 U/L      Alkaline Phosphatase 245 U/L      Total Bilirubin 0.8 mg/dL      Globulin 3.8 gm/dL      A/G Ratio 0.9 g/dL      BUN/Creatinine Ratio 24.2     Anion Gap 13.0 mmol/L      eGFR 124.6 mL/min/1.73     Narrative:      GFR Categories in Chronic Kidney Disease (CKD)      GFR Category          GFR (mL/min/1.73)    Interpretation  G1                     90 or greater         Normal or high (1)  G2                      60-89                Mild decrease (1)  G3a                   45-59                Mild to moderate decrease  G3b                   30-44                Moderate to severe decrease  G4                    15-29                Severe decrease  G5                    14 or less           Kidney failure          (1)In the absence of evidence of kidney disease, neither GFR category G1 or G2 fulfill the criteria for CKD.    eGFR calculation 2021 CKD-EPI creatinine equation, which does not include race as a factor    Rapid Assay For ROM - Amniotic Fluid, Amniotic Sac [[id number]]  (Normal) Collected: 02/09/25 0639    Specimen: Amniotic Fluid from Amniotic Sac Updated: 02/09/25 0718     Rupture of Membranes Negative    CBC & Differential [[id number]]  (Abnormal) Collected: 02/09/25 0658    Specimen: Blood Updated: 02/09/25 0715    Narrative:      The following orders were created for panel order CBC & Differential.  Procedure                               Abnormality          Status                     ---------                               -----------         ------                     CBC Auto Differential[[id number]]        Abnormal            Final result                 Please view results for these tests on the individual orders.    CBC Auto Differential [[id number]]  (Abnormal) Collected: 02/09/25 0658    Specimen: Blood Updated: 02/09/25 0715     WBC 10.64 10*3/mm3      RBC 3.80 10*6/mm3      Hemoglobin 11.8 g/dL      Hematocrit 35.4 %      MCV 93.2 fL      MCH 31.1 pg      MCHC 33.3 g/dL      RDW 13.1 %      RDW-SD 44.6 fl      MPV 9.7 fL      Platelets 367 10*3/mm3      Neutrophil % 63.9 %      Lymphocyte % 25.2 %      Monocyte % 8.7 %      Eosinophil % 1.1 %      Basophil % 0.3 %      Immature Grans % 0.8 %      Neutrophils, Absolute 6.80 10*3/mm3      Lymphocytes, Absolute 2.68 10*3/mm3      Monocytes, Absolute 0.93 10*3/mm3      Eosinophils, Absolute 0.12 10*3/mm3      Basophils, Absolute 0.03 10*3/mm3      Immature Grans, Absolute 0.08 10*3/mm3      nRBC 0.0 /100 WBC     Hepatitis C Antibody [[id number]] Resulted: 09/10/24    Specimen: Blood Updated: 02/09/25 0643     External Hepatitis C Ab Negative          Imaging Results (Last 72 Hours)       ** No results found for the last 72 hours. **          ECG/EMG Results (last 72 hours)       ** No results found for the last 72 hours. **          Orders (last 72 hrs)        Start     Ordered    02/11/25 0600  Comprehensive Metabolic Panel  Morning Draw         02/10/25 0914    02/11/25 0600  CBC & Differential  Morning Draw         02/10/25 0914    02/11/25 0600  Hepatitis Panel, Acute  Morning Draw         02/10/25 0914    02/10/25 0940  Discontinue IV  Once         02/10/25 0940    02/10/25 0939  Discharge patient  Once         02/10/25 0940    02/10/25 0913  traMADol (ULTRAM) tablet 50 mg  Every 6 Hours PRN         02/10/25 0914    02/10/25 0811  Transfer to Postpartum When Criteria Met  Continuous         02/10/25 0810     02/10/25 0800  Sitz Bath  3 Times Daily        Comments: PRN    02/09/25 1600    02/10/25 0757  Measles, Mumps & Rubella Vac (MMR) injection 0.5 mL  During Hospitalization         02/10/25 0757    02/10/25 0600  CBC & Differential  Morning Draw        Comments: Postpartum Day 1      02/09/25 1600    02/10/25 0600  Comprehensive Metabolic Panel  Morning Draw         02/09/25 1615    02/10/25 0600  CBC Auto Differential  PROCEDURE ONCE        Comments: Postpartum Day 1      02/09/25 2201    02/10/25 0000  bisacodyl (DULCOLAX) suppository 10 mg  Daily PRN         02/09/25 1600    02/10/25 0000  docusate sodium 100 MG capsule  2 Times Daily         02/10/25 0940    02/10/25 0000  ibuprofen (ADVIL,MOTRIN) 600 MG tablet  Every 6 Hours PRN         02/10/25 0940    02/10/25 0000  Ambulatory Referral to Gastroenterology         02/10/25 0940    02/10/25 0000  Discharge Follow-up with Specified Provider: Jesse; 6 Weeks         02/10/25 0940    02/10/25 0000  Activity as Tolerated         02/10/25 0940    02/10/25 0000  Pelvic Rest         02/10/25 0940    02/10/25 0000  Driving Restrictions         02/10/25 0940    02/10/25 0000  Diet: Regular/House Diet; Thin (IDDSI 0)         02/10/25 0940    02/09/25 2100  docusate sodium (COLACE) capsule 100 mg  2 Times Daily         02/09/25 1600    02/09/25 1700  prenatal vitamin tablet 1 tablet  Daily         02/09/25 1600    02/09/25 1559  ondansetron (ZOFRAN) injection 4 mg  Every 6 Hours PRN         02/09/25 1600    02/09/25 1559  ondansetron ODT (ZOFRAN-ODT) disintegrating tablet 4 mg  Every 8 Hours PRN         02/09/25 1600    02/09/25 1559  Transfer Patient  Once         02/09/25 1600    02/09/25 1559  Code Status and Medical Interventions: CPR (Attempt to Resuscitate); Full  Continuous         02/09/25 1600    02/09/25 1559  Vital Signs Per hospital policy  Per Hospital Policy/Protocol         02/09/25 1600    02/09/25 1559  Notify Physician  Until Discontinued          02/09/25 1600    02/09/25 1559  Up Ad Keshia  Until Discontinued         02/09/25 1600    02/09/25 1559  Ambulate Patient  Every Shift       02/09/25 1600    02/09/25 1559  Diet: Regular/House; Fluid Consistency: Thin (IDDSI 0)  Diet Effective Now         02/09/25 1600    02/09/25 1559  Advance Diet As Tolerated -Regular  Until Discontinued         02/09/25 1600 02/09/25 1559  Fundal and Lochia Check  Per Hospital Policy/Protocol        Comments: Q 15 min x 4, Q 30 min x 2, then Q Shift    02/09/25 1600    02/09/25 1559  RN to Assess Rh Status & Place RhIG Evaluation Order if Indicated  Continuous         02/09/25 1600 02/09/25 1559  Bladder Assessment  Per Order Details        Comments: Postpartum 1) Upon Admission to Unit & Every 4 Hours PRN Until Voiding. 2) Out of Bed to Void in 8 Hours.    02/09/25 1600    02/09/25 1559  Straight Cath  Per Order Details        Comments: Postpartum: If Distended & Unable to Void, May Repeat Once.    02/09/25 1600 02/09/25 1559  Indwelling Urinary Catheter  Per Order Details        Comments: Postpartum : After Straight Cathed x2 or if Greater Than 1000mL Residual, Insert Indwelling Urinary Catheter Until Further MD Order.    02/09/25 1600 02/09/25 1559  Breast pump to bed  Once         02/09/25 1600 02/09/25 1559  If indicated -- Please administer RH Immunoglobulin based on results of cord blood evaluation and fetal screen lab tests, pharmacy to dispense  Per Order Details        Comments: See Process Instructions For Reference Range Details.    02/09/25 1600 02/09/25 1559  VTE Prophylaxis Not Indicated: Low Risk; Reduced Mobility (3)  Once         02/09/25 1600 02/09/25 1558  temazepam (RESTORIL) capsule 7.5 mg  Nightly PRN         02/09/25 1600 02/09/25 1558  sodium chloride 0.9 % flush 1-10 mL  As Needed         02/09/25 1600 02/09/25 1558  oxytocin (PITOCIN) 30 units in 0.9% sodium chloride 500 mL (premix)  Once As Needed         02/09/25 1600     "02/09/25 1558  ibuprofen (ADVIL,MOTRIN) tablet 600 mg  Every 6 Hours PRN         02/09/25 1600    02/09/25 1558  acetaminophen (TYLENOL) tablet 650 mg  Every 6 Hours PRN,   Status:  Discontinued         02/09/25 1600    02/09/25 1558  HYDROcodone-acetaminophen (NORCO) 5-325 MG per tablet 1 tablet  Every 4 Hours PRN,   Status:  Discontinued        Placed in \"Or\" Linked Group    02/09/25 1600    02/09/25 1558  HYDROcodone-acetaminophen (NORCO)  MG per tablet 1 tablet  Every 4 Hours PRN,   Status:  Discontinued        Placed in \"Or\" Linked Group    02/09/25 1600    02/09/25 1558  magnesium hydroxide (MILK OF MAGNESIA) suspension 10 mL  Daily PRN         02/09/25 1600    02/09/25 1558  all purpose nipple ointment  4 Times Daily PRN         02/09/25 1600    02/09/25 1558  benzocaine-menthol (DERMOPLAST) 20-0.5 % topical spray  As Needed         02/09/25 1600    02/09/25 1558  Hydrocort-Pramoxine (Perianal) (PROCTOFOAM-HS) 1-1 % rectal foam 1 Application  As Needed         02/09/25 1600    02/09/25 1558  Hydrocortisone (Perianal) (ANUSOL-HC) 2.5 % rectal cream  As Needed         02/09/25 1600    02/09/25 1558  benzocaine (AMERICAINE) 20 % rectal ointment  As Needed         02/09/25 1600    02/09/25 1426  oxytocin (PITOCIN) 30 units in 0.9% sodium chloride 500 mL (premix)  Once As Needed         02/09/25 1426    02/09/25 1247  phytonadione (VITAMIN K) 1 MG/0.5ML injection  - ADS Override Pull        Note to Pharmacy: Created by cabinet override    02/09/25 1247    02/09/25 1247  erythromycin (ROMYCIN) 5 MG/GM ophthalmic ointment  - ADS Override Pull        Note to Pharmacy: Created by cabinet override    02/09/25 1247    02/09/25 1100  oxytocin (PITOCIN) 30 units in 0.9% sodium chloride 500 mL (premix)  Titrated,   Status:  Discontinued         02/09/25 1009    02/09/25 0900  sodium chloride 0.9 % flush 10 mL  Every 12 Hours Scheduled,   Status:  Discontinued         02/09/25 0632    02/09/25 0751  Protein / " "Creatinine Ratio, Urine - Urine, Clean Catch  STAT         02/09/25 0750    02/09/25 0745  oxytocin (PITOCIN) 30 units in 0.9% sodium chloride 500 mL (premix)  Continuous        Placed in \"Followed by\" Linked Group    02/09/25 0632    02/09/25 0745  lactated ringers bolus 1,000 mL  Once,   Status:  Discontinued         02/09/25 0658    02/09/25 0745  fentaNYL 2mcg/mL and ropivacaine 0.2% in NS epidural 100mL  Continuous,   Status:  Discontinued         02/09/25 0658    02/09/25 0730  lactated ringers infusion  Continuous,   Status:  Discontinued         02/09/25 0632    02/09/25 0715  oxytocin (PITOCIN) 30 units in 0.9% sodium chloride 500 mL (premix)  Once,   Status:  Discontinued        Placed in \"Followed by\" Linked Group    02/09/25 0632    02/09/25 0659  Vital Signs Per Anesthesia Guidelines  Per Order Details,   Status:  Canceled        Comments: Every 2 Minutes x5, Every 5 Minutes x4, Then If Stable Every 15 Minutes    02/09/25 0658    02/09/25 0659  Start IV (16 or 18 Gauge)  Once,   Status:  Canceled         02/09/25 0658    02/09/25 0659  Fetal Heart Rate Monitor  Once,   Status:  Canceled         02/09/25 0658    02/09/25 0659  Nurse or Anesthesiologist to Remain With Patient for 15 Minutes Following Dosing  Continuous,   Status:  Canceled         02/09/25 0658    02/09/25 0659  Facilitate Maternal Position on Side & Maintain Uterine Displacement  Continuous,   Status:  Canceled         02/09/25 0658    02/09/25 0659  Consult Anesthesia Prior to Changing Epidural Infusion / Rate  Continuous,   Status:  Canceled         02/09/25 0658    02/09/25 0659  Notify Provider  Until Discontinued,   Status:  Canceled         02/09/25 0658    02/09/25 0658  ePHEDrine injection 5 mg  Every 10 Minutes PRN,   Status:  Discontinued         02/09/25 0658    02/09/25 0658  ondansetron (ZOFRAN) injection 4 mg  Once As Needed         02/09/25 0658    02/09/25 0633  Code Status and Medical Interventions: CPR (Attempt to " Resuscitate); Full Support  Continuous,   Status:  Canceled         2532    25  Obtain Informed Consent  Once,   Status:  Canceled         2532    25  Vital Signs Per Hospital Policy  Per Hospital Policy/Protocol,   Status:  Canceled         2532    25  Mini-Prep Prior to Delivery  Once,   Status:  Canceled         25  Continuous Fetal Monitoring With NST on Admission and Prior to Initiation of Oxytocin.  Per Order Details,   Status:  Canceled        Comments: Continuous Fetal Monitoring With NST on Admission & Prior to Initiation of Oxytocin.    25  External Uterine Contraction Monitoring  Per Hospital Policy/Protocol,   Status:  Canceled         25  Notify Provider (Specified)  Until Discontinued,   Status:  Canceled         25  Notify Provider of Tachysystole (Per Hospital Algorithm)  Until Discontinued,   Status:  Canceled         2532    25  Notify Provider if Membranes Ruptured, Bleeding Greater Than 1 Pad Per Hour, Fetal Heart Tone Abnormality or Severe Pain  Until Discontinued,   Status:  Canceled         2532    25  Initiate Group Beta Strep (GBS) Prophylaxis Protocol, If Criteria Met  Continuous,   Status:  Canceled        Comments: NO TREATMENT RECOMMENDED IF: 1) Maternal GBS Status Known Negative 2) Scheduled  Birth With Intact Membranes, Not in Labor 3) Maternal GBS Status Unknown, No Risk Factors  TREAT WITH ANTIBIOTICS IF:  1) Maternal GBS Status Known Positive 2) Maternal GBS Status Unknown With Risk Factors: a)  Previous Infant Affected By GBS Infection b) GBS Urinary Tract Infection (UTI) or Bacteriuria During Pregnancy c) Unexplained Maternal Fever (100.4F (38C) or Greater) During Labor d)  Prolonged Rupture of Membranes (18 or More Hours) e) Gestational Age Less Than 37 Weeks     "02/09/25 0632    02/09/25 0633  Inpatient Anesthesiology Consult  Once,   Status:  Canceled        Specialty:  Anesthesiology  Provider:  (Not yet assigned)    02/09/25 0632    02/09/25 0633  Treponema pallidum AB w/Reflex RPR  Once         02/09/25 0632    02/09/25 0633  CBC & Differential  Once         02/09/25 0632    02/09/25 0633  Comprehensive Metabolic Panel  Once         02/09/25 0632    02/09/25 0633  Type & Screen  Once         02/09/25 0632    02/09/25 0633  Insert Peripheral IV  Once,   Status:  Canceled         02/09/25 0632    02/09/25 0633  Saline Lock & Maintain IV Access  Continuous,   Status:  Canceled         02/09/25 0632    02/09/25 0633  Notify Provider (Specified)  Until Discontinued,   Status:  Canceled         02/09/25 0632    02/09/25 0633  Vital Signs Per Hospital Policy  Per Hospital Policy/Protocol,   Status:  Canceled         02/09/25 0632    02/09/25 0633  Fundal & Lochia Check  Per Order Details,   Status:  Canceled        Comments: Every 15 Minutes x4, Then Every 30 Minutes x2, Then Every Shift    02/09/25 0632    02/09/25 0633  Fundal & Lochia Check  Every Shift,   Status:  Canceled       02/09/25 0632    02/09/25 0633  Diet: Regular/House; Fluid Consistency: Thin (IDDSI 0)  Diet Effective Now,   Status:  Canceled         02/09/25 0632    02/09/25 0633  Advance Diet As Tolerated -  Until Discontinued,   Status:  Canceled         02/09/25 0632    02/09/25 0633  Diet: Liquid; Clear Liquid; Fluid Consistency: Thin (IDDSI 0)  Diet Effective Now,   Status:  Canceled         02/09/25 0632    02/09/25 0633  CBC Auto Differential  PROCEDURE ONCE         02/09/25 0632 02/09/25 0632  ondansetron ODT (ZOFRAN-ODT) disintegrating tablet 4 mg  Every 6 Hours PRN,   Status:  Discontinued        Placed in \"Or\" Linked Group    02/09/25 0632    02/09/25 0632  ondansetron (ZOFRAN) injection 4 mg  Every 6 Hours PRN,   Status:  Discontinued        Placed in \"Or\" Linked Group    02/09/25 0632    " "02/09/25 0632  terbutaline (BRETHINE) injection 0.25 mg  As Needed,   Status:  Discontinued         02/09/25 0632    02/09/25 0632  mineral oil liquid 30 mL  Once As Needed,   Status:  Discontinued         02/09/25 0632    02/09/25 0632  famotidine (PEPCID) injection 20 mg  2 Times Daily PRN,   Status:  Discontinued        Placed in \"Or\" Linked Group    02/09/25 0632    02/09/25 0632  famotidine (PEPCID) tablet 20 mg  2 Times Daily PRN,   Status:  Discontinued        Placed in \"Or\" Linked Group    02/09/25 0632    02/09/25 0632  methylergonovine (METHERGINE) injection 200 mcg  Once As Needed,   Status:  Discontinued         02/09/25 0632 02/09/25 0632  carboprost (HEMABATE) injection 250 mcg  Every 15 Minutes PRN,   Status:  Discontinued         02/09/25 0632 02/09/25 0632  miSOPROStol (CYTOTEC) tablet 800 mcg  Once As Needed,   Status:  Discontinued         02/09/25 0632    02/09/25 0632  tranexamic acid 1000 mg in 100 mL 0.7% NaCl infusion (premix)  Once As Needed,   Status:  Discontinued         02/09/25 0632 02/09/25 0632  Position Change - For Intra-Uterine Resusitation for Hypertonus, HyperStimulation or Non-Reassuring Fetal Status  As Needed,   Status:  Canceled       02/09/25 0632    02/09/25 0632  sodium chloride 0.9 % flush 10 mL  As Needed,   Status:  Discontinued         02/09/25 0632    02/09/25 0632  sodium chloride 0.9 % infusion 40 mL  As Needed,   Status:  Discontinued         02/09/25 0632 02/09/25 0632  lactated ringers bolus 1,000 mL  Once As Needed         02/09/25 0632 02/09/25 0632  acetaminophen (TYLENOL) tablet 650 mg  Every 4 Hours PRN,   Status:  Discontinued         02/09/25 0632    02/09/25 0632  butorphanol (STADOL) injection 2 mg  Every 3 Hours PRN,   Status:  Discontinued         02/09/25 0632    02/09/25 0631  VTE Prophylaxis Not Indicated: Low Risk; No Risk Factors (0)  Once         02/09/25 0631 02/09/25 0629  Admit To Obstetrics Inpatient  Once         02/09/25 " 0631    02/09/25 0601  Vital Signs  Per Hospital Policy/Protocol,   Status:  Canceled        Comments: Repeat blood pressure every 30 minutes, until specified.    02/09/25 0601    02/09/25 0601  Fetal Nonstress Test  Once,   Status:  Canceled        Comments: For any patient >= 24 wks gestation.    02/09/25 0601    02/09/25 0601  Pulse Oximetry, Spot  Once,   Status:  Canceled         02/09/25 0601    02/09/25 0601  POC Glucose STAT  STAT,   Status:  Canceled        Comments: For any patient with any type of diabetes.      02/09/25 0601    02/09/25 0601  Continuous Uterine Monitoring - For Gestational Age >24 weeks  Once         02/09/25 0601    02/09/25 0601  NPO Diet NPO Type: Strict NPO  Diet Effective Now,   Status:  Canceled         02/09/25 0601    02/09/25 0601  Vaginal Exam  Once        Comments: Perform unless patient has vaginal bleeding without known placental site, known placental previa, <36 weeks with no abdominal , or complain of ROM and <36 weeks    02/09/25 0601    02/09/25 0601  POC PH Fluid (Nitrazine)  Once,   Status:  Canceled        Comments: If patient is presenting with possible ROM or leaking      02/09/25 0601    02/09/25 0601  Rapid Assay For ROM - Amniotic Fluid, Amniotic Sac  Once         02/09/25 0601    02/09/25 0000  Hepatitis C Antibody        Comments: This is an external result entered through the Results Console.      02/09/25 0643    Unscheduled  Apply Ice to Perineum  As Needed      Comments: For 20 min q 2 hrs    02/09/25 1600    Unscheduled  Waffle Cushion  As Needed      Comments: For perineal discomfort    02/09/25 1600    Unscheduled  Donut Ring  As Needed      Comments: For perineal pain    02/09/25 1600    Unscheduled  Kpad  As Needed      Comments: For pain    02/09/25 1600    Unscheduled  Apply witch hazel pads / TUCKS to perineum as needed for comfort PRN  As Needed       02/09/25 1600    Unscheduled  Warm compress  As Needed       02/09/25 1600    Unscheduled  Apply  ace wrap, tight bra, or binder  As Needed       25 1600    Unscheduled  Apply ice packs  As Needed       25 1600    Unscheduled  Rubella Antibody, IgG  As Needed        Comments: RN to release order for lab to be drawn within first day of delivery if maternal status unknown.      25 1600    Unscheduled  RPR Qualitative with Reflex to Quant  As Needed        Comments: Within first day of delivery if maternal status unknown.      25 1600    Unscheduled  Hepatitis B Surface Antigen  As Needed        Comments: Within first day of delivery if maternal status unknown.      25 1600    Unscheduled  HIV-1 & HIV-2 Antibodies  As Needed        Comments: If patient agreeable, perform within first day of delivery if maternal status unknown.      25 1600                     Operative/Procedure Notes (last 72 hours)        Trey Armenta MD at 25 1713          DELIVERY REPORT    [facility]  Patient: Leeroy Cunningham  : 1995  Age: 29 y.o.  Unit Number: [id number]    DATE OF DELIVERY: 2025    PREOPERATIVE DIAGNOSIS:  1)  Intrauterine Pregnancy at 39 weeks.  2)  History of Intrauterine Fetal Demise.  3)  Large for Gestational Age.    POSTOPERATIVE DIAGNOSIS:  Same as pre-op diagnosis    PROCEDURE PERFORMED:   Spontaneous Vaginal Delivery    SURGEON: Trey Armenta MD    ASSISTANT:   None    ANESTHESIA:   Epidural    ESTIMATED BLOOD LOSS:   500 ml    FINDINGS:     Live Viable Female Infant //  Weight  8 lbs  3 oz // Apgar 1 minute 9 and Apgar 5 minutes 9             Normal placenta and cord                        1st Degree laceration    DESCRIPTION OF PROCEDURE: Patient is a 29 year old G2 with previous history of intrauterine fetal demise who presented in active labor at 38 weeks.  She had reassuring fetal tracing and received an epidural for pain control.  Patient had spontaneous rupture of membranes and progress along a normal labor curve.   She required some pitocin for augmentation.  An intrauterine pressure catheter was placed.  She reached the second stage of labor and began pushing.  She pushed for less than 30 minutes and delivery team was assembled.  Perineum was prepped with betadine and patient continued to push.  With continued pushing, she delivered a live viable female infant in the occiput anterior position.  The nose and mouth were bulb suctioned upon presentation of the head.  With continued pushing, the shoulders were not direct anterior/posterior position.  With palpation of the anterior shoulder during pushing, the shoulder was guided thru the introitus, reducing the posterior shoulder.  The remainder of the torso and body were delivered without difficulty.  Infant was vigorous, delayed cord clamping occurred, the cord was cut and infant was presented to family.  Cord blood was obtained and placenta was delivered spontaneously intact with 3 vessel cord was noted.  A first degree vaginal laceration was repaired with 3-0 Monocryl without difficulty.  Pitocin was given for prevention of uterine atony.    COMPLICATIONS: None  SPECIMEN:  Cord blood  DISPOSITION:  Mother and baby remained in LDR in stable condition       Trey Armenta MD  Completed: 2/9/2025     Electronically signed by Trey Armenta MD at 02/09/25 1742          Physician Progress Notes (last 72 hours)        Isabella Sargent, APRN at 02/10/25 0935          Postpartum Progress Note      Status post Vaginal Delivery: Doing well postoperatively.     1) Postpartum care immediately following delivery :    Routine care, continue current care. Desires discharge home today. Reviewed discharge instructions in detail.  used for this visit    2) Elevated LFT: Denies hx abnormal LFT. Trending down this AM. -->96, AST 55-->49. Platelets normal. BP normal range. Has pending hepatitis panel ordered. Advised will enter referral to GI for further  evaluation and management    3) Postpartum anemia: Acute on chronic. Secondary to acute blood loss at delivery.  cc. Hgb 11.8-->9.6.  Asymptomatic. Will d/c home with iron supplement     Rh status: O+  Syphilis screen in hospital: nonreactive  Rubella: Not immune, MMR ordered  Gender: Female    Subjective     Postpartum Day 1: Vaginal delivery    The patient feels well. The patient denies emotional concerns. Pain is well controlled with current medications. The baby is well. The patient is ambulating well. The patient is tolerating a normal diet.     Objective     Vital signs in last 24 hours:  Temp:  [97.9 °F (36.6 °C)-99.8 °F (37.7 °C)] 97.9 °F (36.6 °C)  Heart Rate:  [] 82  Resp:  [16-18] 18  BP: ()/(19-94) 109/67      General:    alert, appears stated age, and cooperative   CV: RRR, no m/r/g   Lungs: CTAB   Abdomen:  Soft, Non-tender    Lochia:  appropriate   Uterine Fundus:   firm   Ext    Edema trace   DVT Evaluation:  No evidence of DVT seen on physical exam.     Lab Results   Component Value Date    WBC 11.12 (H) 02/10/2025    HGB 9.6 (L) 02/10/2025    HCT 27.4 (L) 02/10/2025    MCV 91.9 02/10/2025     02/10/2025       ELI Lin  2/10/2025  09:35 EST       Electronically signed by Isabella Sargent APRN at 02/10/25 0937       Consult Notes (last 72 hours)  Notes from 02/07/25 1105 through 02/10/25 1105   No notes of this type exist for this encounter.

## 2025-02-10 NOTE — PLAN OF CARE
Goal Outcome Evaluation:   VSS. Up ad salomon. Lochia and fundus WNL. Bonding well with baby. Voiding without difficulty. Bottle feeding. D/C for today.

## 2025-02-10 NOTE — DISCHARGE SUMMARY
Date of Discharge:  2/10/2025    Discharge Diagnosis: s/p vaginal delivery     Presenting Problem/History of Present Illness  Pregnancy [Z34.90]     Hospital Course  Patient is a 29 y.o. female presented with contractions.  Her pregnancy was complicated by history of term fetal demise.  She delivered a viable female infant weighing 8lb 3.2oz with apgars of 9&9. For further information surrounding this delivery please seen delivery note. Her postpartum course has been complicated by elevated liver function studies, AST and ALT are improving. Collecting hepatitis panel today. She will follow up with gastroenterology. She is voiding adequately, tolerating a regular diet, and she is ambulating without difficulty or assistance. Her pain is well controlled. We have reviewed discharge instructions in detail.     Procedures Performed         Consults:   Consults       No orders found for last 30 day(s).            Pertinent Test Results:   WBC   Date Value Ref Range Status   02/10/2025 11.12 (H) 3.40 - 10.80 10*3/mm3 Final     RBC   Date Value Ref Range Status   02/10/2025 2.98 (L) 3.77 - 5.28 10*6/mm3 Final     Hemoglobin   Date Value Ref Range Status   02/10/2025 9.6 (L) 12.0 - 15.9 g/dL Final     Hematocrit   Date Value Ref Range Status   02/10/2025 27.4 (L) 34.0 - 46.6 % Final     MCV   Date Value Ref Range Status   02/10/2025 91.9 79.0 - 97.0 fL Final     MCH   Date Value Ref Range Status   02/10/2025 32.2 26.6 - 33.0 pg Final     MCHC   Date Value Ref Range Status   02/10/2025 35.0 31.5 - 35.7 g/dL Final     RDW   Date Value Ref Range Status   02/10/2025 13.0 12.3 - 15.4 % Final     RDW-SD   Date Value Ref Range Status   02/10/2025 42.7 37.0 - 54.0 fl Final     MPV   Date Value Ref Range Status   02/10/2025 9.9 6.0 - 12.0 fL Final     Platelets   Date Value Ref Range Status   02/10/2025 294 140 - 450 10*3/mm3 Final     Neutrophil %   Date Value Ref Range Status   02/10/2025 66.1 42.7 - 76.0 % Final     Lymphocyte %    Date Value Ref Range Status   02/10/2025 21.7 19.6 - 45.3 % Final     Monocyte %   Date Value Ref Range Status   02/10/2025 10.6 5.0 - 12.0 % Final     Eosinophil %   Date Value Ref Range Status   02/10/2025 0.9 0.3 - 6.2 % Final     Basophil %   Date Value Ref Range Status   02/10/2025 0.3 0.0 - 1.5 % Final     Immature Grans %   Date Value Ref Range Status   02/10/2025 0.4 0.0 - 0.5 % Final     Neutrophils, Absolute   Date Value Ref Range Status   02/10/2025 7.35 (H) 1.70 - 7.00 10*3/mm3 Final     Lymphocytes, Absolute   Date Value Ref Range Status   02/10/2025 2.41 0.70 - 3.10 10*3/mm3 Final     Monocytes, Absolute   Date Value Ref Range Status   02/10/2025 1.18 (H) 0.10 - 0.90 10*3/mm3 Final     Eosinophils, Absolute   Date Value Ref Range Status   02/10/2025 0.10 0.00 - 0.40 10*3/mm3 Final     Basophils, Absolute   Date Value Ref Range Status   02/10/2025 0.03 0.00 - 0.20 10*3/mm3 Final     Immature Grans, Absolute   Date Value Ref Range Status   02/10/2025 0.05 0.00 - 0.05 10*3/mm3 Final     nRBC   Date Value Ref Range Status   02/10/2025 0.0 0.0 - 0.2 /100 WBC Final       Condition on Discharge:  Stable    Vital Signs  Temp:  [97.9 °F (36.6 °C)-99.8 °F (37.7 °C)] 97.9 °F (36.6 °C)  Heart Rate:  [] 82  Resp:  [16-18] 18  BP: ()/(19-94) 109/67    Physical Exam:   See Progress Note    Discharge Disposition  Home or Self Care    Discharge Medications     Discharge Medications        New Medications        Instructions Start Date   docusate sodium 100 MG capsule   100 mg, Oral, 2 Times Daily      ibuprofen 600 MG tablet  Commonly known as: ADVIL,MOTRIN   600 mg, Oral, Every 6 Hours PRN             Continue These Medications        Instructions Start Date   ferrous sulfate 325 (65 FE) MG tablet   325 mg, Oral, Daily With Breakfast      Prenatal 28-0.8 MG tablet   1 tablet, Oral, Daily, Please use formulary or generic, with DHA ideal             Stop These Medications      aspirin 81 MG chewable  tablet     methylPREDNISolone 4 MG dose pack  Commonly known as: MEDROL              Discharge Diet: Regular    Activity at Discharge: Pelvic rest X6 weeks    Education: Warning signs and symptoms given, no tub baths, nothing in the vagina for 6 weeks, no driving for 2 weeks or while talking narcotics     Follow-up Appointments  Future Appointments   Date Time Provider Department Center   3/20/2025 10:30 AM Lewis Molina MD [facility]     Additional Instructions for the Follow-ups that You Need to Schedule       Discharge Follow-up with Specified Provider: Jesse; 6 Weeks   As directed      To: Jesse   Follow Up: 6 Weeks   Follow Up Details: Postpartum follow up                Test Results Pending at Discharge       Isabella Sargent, APRN  02/10/25  09:40 EST    Time: 09:40 EST

## 2025-02-10 NOTE — PROGRESS NOTES
Postpartum Progress Note      Status post Vaginal Delivery: Doing well postoperatively.     1) Postpartum care immediately following delivery :    Routine care, continue current care. Desires discharge home today. Reviewed discharge instructions in detail.  used for this visit    2) Elevated LFT: Denies hx abnormal LFT. Trending down this AM. -->96, AST 55-->49. Platelets normal. BP normal range. Has pending hepatitis panel ordered. Advised will enter referral to GI for further evaluation and management    3) Postpartum anemia: Acute on chronic. Secondary to acute blood loss at delivery.  cc. Hgb 11.8-->9.6.  Asymptomatic. Will d/c home with iron supplement     Rh status: O+  Syphilis screen in hospital: nonreactive  Rubella: Not immune, MMR ordered  Gender: Female    Subjective     Postpartum Day 1: Vaginal delivery    The patient feels well. The patient denies emotional concerns. Pain is well controlled with current medications. The baby is well. The patient is ambulating well. The patient is tolerating a normal diet.     Objective     Vital signs in last 24 hours:  Temp:  [97.9 °F (36.6 °C)-99.8 °F (37.7 °C)] 97.9 °F (36.6 °C)  Heart Rate:  [] 82  Resp:  [16-18] 18  BP: ()/(19-94) 109/67      General:    alert, appears stated age, and cooperative   CV: RRR, no m/r/g   Lungs: CTAB   Abdomen:  Soft, Non-tender    Lochia:  appropriate   Uterine Fundus:   firm   Ext    Edema trace   DVT Evaluation:  No evidence of DVT seen on physical exam.     Lab Results   Component Value Date    WBC 11.12 (H) 02/10/2025    HGB 9.6 (L) 02/10/2025    HCT 27.4 (L) 02/10/2025    MCV 91.9 02/10/2025     02/10/2025       Isabella Sargent, APRN  2/10/2025  09:35 EST

## 2025-02-10 NOTE — LACTATION NOTE
Rounded on mom again at this time. She is planning on breast and formula feeding. Informed PT that LC is available to help with BF until 2000. Mother reports baby BF well so far.  Encouraged PT to try BF baby every 2-3 h. or PRN and always to offer breast first and then bottle.  Educated on the importance of stimulation for adequate milk supply. Instructions on how to rouse infant for feeding also given. Mom needs PBP and LC faxed the prescription.She denies any questions. Encouraged to call if needing help. I phone  utilized.

## 2025-02-10 NOTE — LACTATION NOTE
This note was copied from a baby's chart.  P2, T baby- 20 h.old. Attempted to see mom 3 times,but  there is always a different hospital service in the room. Will try again later.

## 2025-02-10 NOTE — PROGRESS NOTES
"Discharge Planning Assessment  [facility]     Patient Name: Leeroy Cunningham  MRN: [id number]  Today's Date: 2/10/2025    Admit Date: 2/9/2025    Plan: Infant may discharge to mother when medically ready; CSW will follow cord tox. TODD Ortiz.   Discharge Needs Assessment    No documentation.                  Discharge Plan       Row Name 02/10/25 1459       Plan    Plan Infant may discharge to mother when medically ready; CSW will follow cord tox. TODD Ortiz.    Plan Comments Mother: Leeroy Cunningham, MRN: [id number]; infant: Peter \"Charianny\" Octavio, MRN: [id number]. CSW was not consulted but infant's cord toxicology was sent for \"late prenatal care.\" Of note, mother's UDS was negative prenatally on 9/10. Mother's UDS was not collected on admit. Infant's UDS was missed; cord toxicology sent. No psychosocial needs or concerns were noted at this time. CSW made the decision that mother did not require a needs-based assessment at this time. CSW will follow cord toxicology, and complete mandated reporting to CPS if warranted. TODD Ortiz.                  Continued Care and Services - Admitted Since 2/9/2025    No active coordination exists for this encounter.       Selected Continued Care - Episodes Includes continued care and service providers with selected services from the active episodes listed below      [facility] Episode start date: 10/8/2024   There are no active outsourced providers for this episode.                 Expected Discharge Date and Time       Expected Discharge Date Expected Discharge Time    Feb 10, 2025            Demographic Summary       Row Name 02/10/25 1459       General Information    Admission Type inpatient    Arrived From home    Reason for Consult other (see comments)    General Information Comments Late prenatal care                   Functional Status    No documentation.                  Psychosocial    No documentation.        "           Abuse/Neglect    No documentation.                  Legal    No documentation.                  Substance Abuse    No documentation.                  Patient Forms    No documentation.                     SIERRA Sam

## 2025-02-11 NOTE — PAYOR COMM NOTE
"Leeroy Cunningham (29 y.o. Female)       Date of Birth   1995    Social Security Number       Address   [address]   [address] [place] [address]    Home Phone   [phone]    MRN   [id number]       Spiritism   None    Marital Status   Single                            Admission Date   2/9/25    Admission Type   Emergency    Admitting Provider   Cathleen León MD    Attending Provider       Department, Room/Bed   [address], S305/1       Discharge Date   2/10/2025    Discharge Disposition   Home or Self Care    Discharge Destination                                 Attending Provider: (none)   Allergies: No Known Allergies    Isolation: None   Infection: None   Code Status: Prior    Ht: 153 cm (60.24\")   Wt: 69.5 kg (153 lb 3.2 oz)    Admission Cmt: None   Principal Problem: Pregnancy [Z34.90]                   Active Insurance as of 2/9/2025       Primary Coverage       Payor Plan Insurance Group Employer/Plan Group    [facility]UNITY PLAN [facility] PLAN [facility]       Payor Plan Address Payor Plan Phone Number Payor Plan Fax Number Effective Dates    [address]   8/1/2024 - None Entered    [facility] [id number]         Subscriber Name Subscriber Birth Date Member ID       LEEROY CUNNINGHAM 1995 [id number]                     Emergency Contacts        (Rel.) Home Phone Work Phone Mobile Phone    DEAN MATAMOROS (Relative) -- -- [phone]              Insurance Information                  [facility] Phone: --    Subscriber: Leeroy Cunningham Subscriber#: [id number]    Group#: [id number] Precert#: --    Authorization#: [id number] Effective Date: --          Problem List             Codes Noted - Resolved       Hospital    * (Principal) Pregnancy ICD-10-CM: Z34.90  ICD-9-CM: V22.2 2/9/2025 - Present     History & Physical    No notes of this type exist for this encounter.   "     Facility-Administered Medications as of 2/10/2025   Medication Dose Route Frequency Provider Last Rate Last Admin    [] erythromycin (ROMYCIN) 5 MG/GM ophthalmic ointment  - ADS Override Pull             [] lactated ringers bolus 1,000 mL  1,000 mL Intravenous Once PRN Cathleen León MD 1,000 mL/hr at 25 0701 1,000 mL at 25 0701    [COMPLETED] ondansetron (ZOFRAN) injection 4 mg  4 mg Intravenous Once PRN Yulisa Beckwith MD   4 mg at 25 1445    [] oxytocin (PITOCIN) 30 units in 0.9% sodium chloride 500 mL (premix)  250 mL/hr Intravenous Continuous Cathleen León MD   Stopped at 25 1457    [COMPLETED] oxytocin (PITOCIN) 30 units in 0.9% sodium chloride 500 mL (premix)  125 mL/hr Intravenous Once PRN Trey Armenta  mL/hr at 25 1457 125 mL/hr at 25 1457    [] phytonadione (VITAMIN K) 1 MG/0.5ML injection  - ADS Override Pull              Lab Results (last 72 hours)       Procedure Component Value Units Date/Time    Hepatitis Panel, Acute [[id number]]  (Normal) Collected: 02/10/25 1214    Specimen: Blood Updated: 02/10/25 1318     Hepatitis B Surface Ag Non-Reactive     Hep A IgM Non-Reactive     Hep B C IgM Non-Reactive     Hepatitis C Ab Non-Reactive    Narrative:      Results may be falsely decreased if patient taking Biotin.     Comprehensive Metabolic Panel [[id number]]  (Abnormal) Collected: 02/10/25 0555    Specimen: Blood Updated: 02/10/25 0648     Glucose 91 mg/dL      BUN 8 mg/dL      Creatinine 0.58 mg/dL      Sodium 137 mmol/L      Potassium 4.1 mmol/L      Chloride 108 mmol/L      CO2 22.8 mmol/L      Calcium 8.6 mg/dL      Total Protein 5.8 g/dL      Albumin 2.7 g/dL      ALT (SGPT) 96 U/L      AST (SGOT) 49 U/L      Alkaline Phosphatase 179 U/L      Total Bilirubin 0.6 mg/dL      Globulin 3.1 gm/dL      A/G Ratio 0.9 g/dL      BUN/Creatinine Ratio 13.8     Anion Gap 6.2 mmol/L      eGFR 126.6 mL/min/1.73      Narrative:      GFR Categories in Chronic Kidney Disease (CKD)      GFR Category          GFR (mL/min/1.73)    Interpretation  G1                     90 or greater         Normal or high (1)  G2                      60-89                Mild decrease (1)  G3a                   45-59                Mild to moderate decrease  G3b                   30-44                Moderate to severe decrease  G4                    15-29                Severe decrease  G5                    14 or less           Kidney failure          (1)In the absence of evidence of kidney disease, neither GFR category G1 or G2 fulfill the criteria for CKD.    eGFR calculation 2021 CKD-EPI creatinine equation, which does not include race as a factor    CBC & Differential [[id number]]  (Abnormal) Collected: 02/10/25 0555    Specimen: Blood Updated: 02/10/25 0631    Narrative:      The following orders were created for panel order CBC & Differential.  Procedure                               Abnormality         Status                     ---------                               -----------         ------                     CBC Auto Differential[[id number]]        Abnormal            Final result                 Please view results for these tests on the individual orders.    CBC Auto Differential [[id number]]  (Abnormal) Collected: 02/10/25 0555    Specimen: Blood Updated: 02/10/25 0631     WBC 11.12 10*3/mm3      RBC 2.98 10*6/mm3      Hemoglobin 9.6 g/dL      Hematocrit 27.4 %      MCV 91.9 fL      MCH 32.2 pg      MCHC 35.0 g/dL      RDW 13.0 %      RDW-SD 42.7 fl      MPV 9.9 fL      Platelets 294 10*3/mm3      Neutrophil % 66.1 %      Lymphocyte % 21.7 %      Monocyte % 10.6 %      Eosinophil % 0.9 %      Basophil % 0.3 %      Immature Grans % 0.4 %      Neutrophils, Absolute 7.35 10*3/mm3      Lymphocytes, Absolute 2.41 10*3/mm3      Monocytes, Absolute 1.18 10*3/mm3      Eosinophils, Absolute 0.10 10*3/mm3      Basophils, Absolute 0.03 10*3/mm3       Immature Grans, Absolute 0.05 10*3/mm3      nRBC 0.0 /100 WBC     Protein / Creatinine Ratio, Urine - Urine, Clean Catch [[id number]]  (Abnormal) Collected: 02/09/25 0826    Specimen: Urine, Clean Catch Updated: 02/09/25 0910     Protein/Creatinine Ratio, Urine 202.3 mg/G Crea      Creatinine, Urine 59.8 mg/dL      Total Protein, Urine 12.1 mg/dL     Treponema pallidum AB w/Reflex RPR [[id number]]  (Normal) Collected: 02/09/25 0658    Specimen: Blood Updated: 02/09/25 0734     Treponemal AB Total Non-Reactive    Narrative:      Reactive results will reflex RPR testing.    Comprehensive Metabolic Panel [[id number]]  (Abnormal) Collected: 02/09/25 0658    Specimen: Blood Updated: 02/09/25 0732     Glucose 87 mg/dL      BUN 15 mg/dL      Creatinine 0.62 mg/dL      Sodium 135 mmol/L      Potassium 3.7 mmol/L      Chloride 102 mmol/L      CO2 20.0 mmol/L      Calcium 9.4 mg/dL      Total Protein 7.4 g/dL      Albumin 3.6 g/dL      ALT (SGPT) 123 U/L      AST (SGOT) 55 U/L      Alkaline Phosphatase 245 U/L      Total Bilirubin 0.8 mg/dL      Globulin 3.8 gm/dL      A/G Ratio 0.9 g/dL      BUN/Creatinine Ratio 24.2     Anion Gap 13.0 mmol/L      eGFR 124.6 mL/min/1.73     Narrative:      GFR Categories in Chronic Kidney Disease (CKD)      GFR Category          GFR (mL/min/1.73)    Interpretation  G1                     90 or greater         Normal or high (1)  G2                      60-89                Mild decrease (1)  G3a                   45-59                Mild to moderate decrease  G3b                   30-44                Moderate to severe decrease  G4                    15-29                Severe decrease  G5                    14 or less           Kidney failure          (1)In the absence of evidence of kidney disease, neither GFR category G1 or G2 fulfill the criteria for CKD.    eGFR calculation 2021 CKD-EPI creatinine equation, which does not include race as a factor    Rapid Assay For ROM - Amniotic  Fluid, Amniotic Sac [[id number]]  (Normal) Collected: 02/09/25 0639    Specimen: Amniotic Fluid from Amniotic Sac Updated: 02/09/25 0718     Rupture of Membranes Negative    CBC & Differential [[id number]]  (Abnormal) Collected: 02/09/25 0658    Specimen: Blood Updated: 02/09/25 0715    Narrative:      The following orders were created for panel order CBC & Differential.  Procedure                               Abnormality         Status                     ---------                               -----------         ------                     CBC Auto Differential[[id number]]        Abnormal            Final result                 Please view results for these tests on the individual orders.    CBC Auto Differential [[id number]]  (Abnormal) Collected: 02/09/25 0658    Specimen: Blood Updated: 02/09/25 0715     WBC 10.64 10*3/mm3      RBC 3.80 10*6/mm3      Hemoglobin 11.8 g/dL      Hematocrit 35.4 %      MCV 93.2 fL      MCH 31.1 pg      MCHC 33.3 g/dL      RDW 13.1 %      RDW-SD 44.6 fl      MPV 9.7 fL      Platelets 367 10*3/mm3      Neutrophil % 63.9 %      Lymphocyte % 25.2 %      Monocyte % 8.7 %      Eosinophil % 1.1 %      Basophil % 0.3 %      Immature Grans % 0.8 %      Neutrophils, Absolute 6.80 10*3/mm3      Lymphocytes, Absolute 2.68 10*3/mm3      Monocytes, Absolute 0.93 10*3/mm3      Eosinophils, Absolute 0.12 10*3/mm3      Basophils, Absolute 0.03 10*3/mm3      Immature Grans, Absolute 0.08 10*3/mm3      nRBC 0.0 /100 WBC     Hepatitis C Antibody [[id number]] Resulted: 09/10/24    Specimen: Blood Updated: 02/09/25 0643     External Hepatitis C Ab Negative          Imaging Results (Last 72 Hours)       ** No results found for the last 72 hours. **          ECG/EMG Results (last 72 hours)       ** No results found for the last 72 hours. **          Orders (last 72 hrs)        Start     Ordered    02/11/25 0600  Comprehensive Metabolic Panel  Morning Draw,   Status:  Canceled         02/10/25 0914    02/11/25  0600  CBC & Differential  Morning Draw,   Status:  Canceled         02/10/25 0914    02/11/25 0600  Hepatitis Panel, Acute  Morning Draw,   Status:  Canceled         02/10/25 0914    02/10/25 1203  Hepatitis Panel, Acute  STAT         02/10/25 1202    02/10/25 0940  Discontinue IV  Once,   Status:  Canceled         02/10/25 0940    02/10/25 0939  Discharge patient  Once         02/10/25 0940    02/10/25 0913  traMADol (ULTRAM) tablet 50 mg  Every 6 Hours PRN,   Status:  Discontinued         02/10/25 0914    02/10/25 0811  Transfer to Postpartum When Criteria Met  Continuous,   Status:  Canceled         02/10/25 0810    02/10/25 0800  Sitz Bath  3 Times Daily,   Status:  Canceled        Comments: PRN    02/09/25 1600    02/10/25 0757  Measles, Mumps & Rubella Vac (MMR) injection 0.5 mL  During Hospitalization,   Status:  Discontinued         02/10/25 0757    02/10/25 0600  CBC & Differential  Morning Draw        Comments: Postpartum Day 1      02/09/25 1600    02/10/25 0600  Comprehensive Metabolic Panel  Morning Draw         02/09/25 1615    02/10/25 0600  CBC Auto Differential  PROCEDURE ONCE        Comments: Postpartum Day 1      02/09/25 2201    02/10/25 0000  bisacodyl (DULCOLAX) suppository 10 mg  Daily PRN,   Status:  Discontinued         02/09/25 1600    02/10/25 0000  docusate sodium 100 MG capsule  2 Times Daily         02/10/25 0940    02/10/25 0000  ibuprofen (ADVIL,MOTRIN) 600 MG tablet  Every 6 Hours PRN         02/10/25 0940    02/10/25 0000  Ambulatory Referral to Gastroenterology         02/10/25 0940    02/10/25 0000  Discharge Follow-up with Specified Provider: Jesse; 6 Weeks         02/10/25 0940    02/10/25 0000  Activity as Tolerated         02/10/25 0940    02/10/25 0000  Pelvic Rest         02/10/25 0940    02/10/25 0000  Driving Restrictions         02/10/25 0940    02/10/25 0000  Diet: Regular/House Diet; Thin (IDDSI 0)         02/10/25 0940    02/09/25 2100  docusate sodium (COLACE)  capsule 100 mg  2 Times Daily,   Status:  Discontinued         02/09/25 1600    02/09/25 1700  prenatal vitamin tablet 1 tablet  Daily,   Status:  Discontinued         02/09/25 1600    02/09/25 1559  ondansetron (ZOFRAN) injection 4 mg  Every 6 Hours PRN,   Status:  Discontinued         02/09/25 1600    02/09/25 1559  ondansetron ODT (ZOFRAN-ODT) disintegrating tablet 4 mg  Every 8 Hours PRN,   Status:  Discontinued         02/09/25 1600    02/09/25 1559  Transfer Patient  Once         02/09/25 1600    02/09/25 1559  Code Status and Medical Interventions: CPR (Attempt to Resuscitate); Full  Continuous,   Status:  Canceled         02/09/25 1600    02/09/25 1559  Vital Signs Per hospital policy  Per Hospital Policy/Protocol,   Status:  Canceled         02/09/25 1600    02/09/25 1559  Notify Physician  Until Discontinued,   Status:  Canceled         02/09/25 1600    02/09/25 1559  Up Ad Keshia  Until Discontinued,   Status:  Canceled         02/09/25 1600    02/09/25 1559  Ambulate Patient  Every Shift,   Status:  Canceled       02/09/25 1600    02/09/25 1559  Diet: Regular/House; Fluid Consistency: Thin (IDDSI 0)  Diet Effective Now,   Status:  Canceled         02/09/25 1600    02/09/25 1559  Advance Diet As Tolerated -Regular  Until Discontinued,   Status:  Canceled         02/09/25 1600    02/09/25 1559  Fundal and Lochia Check  Per Hospital Policy/Protocol,   Status:  Canceled        Comments: Q 15 min x 4, Q 30 min x 2, then Q Shift    02/09/25 1600    02/09/25 1559  RN to Assess Rh Status & Place RhIG Evaluation Order if Indicated  Continuous,   Status:  Canceled         02/09/25 1600    02/09/25 1559  Bladder Assessment  Per Order Details,   Status:  Canceled        Comments: Postpartum 1) Upon Admission to Unit & Every 4 Hours PRN Until Voiding. 2) Out of Bed to Void in 8 Hours.    02/09/25 1600    02/09/25 1559  Straight Cath  Per Order Details,   Status:  Canceled        Comments: Postpartum: If Distended &  Unable to Void, May Repeat Once.    02/09/25 1600    02/09/25 1559  Indwelling Urinary Catheter  Per Order Details,   Status:  Canceled        Comments: Postpartum : After Straight Cathed x2 or if Greater Than 1000mL Residual, Insert Indwelling Urinary Catheter Until Further MD Order.    02/09/25 1600    02/09/25 1559  Breast pump to bed  Once,   Status:  Canceled         02/09/25 1600    02/09/25 1559  If indicated -- Please administer RH Immunoglobulin based on results of cord blood evaluation and fetal screen lab tests, pharmacy to dispense  Per Order Details,   Status:  Canceled        Comments: See Process Instructions For Reference Range Details.    02/09/25 1600    02/09/25 1559  VTE Prophylaxis Not Indicated: Low Risk; Reduced Mobility (3)  Once         02/09/25 1600    02/09/25 1558  temazepam (RESTORIL) capsule 7.5 mg  Nightly PRN,   Status:  Discontinued         02/09/25 1600    02/09/25 1558  Apply Ice to Perineum  As Needed,   Status:  Canceled      Comments: For 20 min q 2 hrs    02/09/25 1600    02/09/25 1558  Waffle Cushion  As Needed,   Status:  Canceled      Comments: For perineal discomfort    02/09/25 1600    02/09/25 1558  Donut Ring  As Needed,   Status:  Canceled      Comments: For perineal pain    02/09/25 1600    02/09/25 1558  Kpad  As Needed,   Status:  Canceled      Comments: For pain    02/09/25 1600    02/09/25 1558  Apply witch hazel pads / TUCKS to perineum as needed for comfort PRN  As Needed,   Status:  Canceled       02/09/25 1600    02/09/25 1558  Warm compress  As Needed,   Status:  Canceled       02/09/25 1600    02/09/25 1558  Apply ace wrap, tight bra, or binder  As Needed,   Status:  Canceled       02/09/25 1600    02/09/25 1558  Apply ice packs  As Needed,   Status:  Canceled       02/09/25 1600    02/09/25 1558  Rubella Antibody, IgG  As Needed,   Status:  Canceled        Comments: RN to release order for lab to be drawn within first day of delivery if maternal status  "unknown.      02/09/25 1600    02/09/25 1558  RPR Qualitative with Reflex to Quant  As Needed,   Status:  Canceled        Comments: Within first day of delivery if maternal status unknown.      02/09/25 1600    02/09/25 1558  Hepatitis B Surface Antigen  As Needed,   Status:  Canceled        Comments: Within first day of delivery if maternal status unknown.      02/09/25 1600    02/09/25 1558  HIV-1 & HIV-2 Antibodies  As Needed,   Status:  Canceled        Comments: If patient agreeable, perform within first day of delivery if maternal status unknown.      02/09/25 1600    02/09/25 1558  sodium chloride 0.9 % flush 1-10 mL  As Needed,   Status:  Discontinued         02/09/25 1600    02/09/25 1558  oxytocin (PITOCIN) 30 units in 0.9% sodium chloride 500 mL (premix)  Once As Needed,   Status:  Discontinued         02/09/25 1600 02/09/25 1558  ibuprofen (ADVIL,MOTRIN) tablet 600 mg  Every 6 Hours PRN,   Status:  Discontinued         02/09/25 1600    02/09/25 1558  acetaminophen (TYLENOL) tablet 650 mg  Every 6 Hours PRN,   Status:  Discontinued         02/09/25 1600    02/09/25 1558  HYDROcodone-acetaminophen (NORCO) 5-325 MG per tablet 1 tablet  Every 4 Hours PRN,   Status:  Discontinued        Placed in \"Or\" Linked Group    02/09/25 1600    02/09/25 1558  HYDROcodone-acetaminophen (NORCO)  MG per tablet 1 tablet  Every 4 Hours PRN,   Status:  Discontinued        Placed in \"Or\" Linked Group    02/09/25 1600    02/09/25 1558  magnesium hydroxide (MILK OF MAGNESIA) suspension 10 mL  Daily PRN,   Status:  Discontinued         02/09/25 1600    02/09/25 1558  all purpose nipple ointment  4 Times Daily PRN,   Status:  Discontinued         02/09/25 1600    02/09/25 1558  benzocaine-menthol (DERMOPLAST) 20-0.5 % topical spray  As Needed,   Status:  Discontinued         02/09/25 1600    02/09/25 1558  Hydrocort-Pramoxine (Perianal) (PROCTOFOAM-HS) 1-1 % rectal foam 1 Application  As Needed,   Status:  Discontinued     " "    02/09/25 1600    02/09/25 1558  Hydrocortisone (Perianal) (ANUSOL-HC) 2.5 % rectal cream  As Needed,   Status:  Discontinued         02/09/25 1600    02/09/25 1558  benzocaine (AMERICAINE) 20 % rectal ointment  As Needed,   Status:  Discontinued         02/09/25 1600    02/09/25 1426  oxytocin (PITOCIN) 30 units in 0.9% sodium chloride 500 mL (premix)  Once As Needed         02/09/25 1426    02/09/25 1247  phytonadione (VITAMIN K) 1 MG/0.5ML injection  - ADS Override Pull        Note to Pharmacy: Created by cabinet override    02/09/25 1247    02/09/25 1247  erythromycin (ROMYCIN) 5 MG/GM ophthalmic ointment  - ADS Override Pull        Note to Pharmacy: Created by cabinet override    02/09/25 1247    02/09/25 1100  oxytocin (PITOCIN) 30 units in 0.9% sodium chloride 500 mL (premix)  Titrated,   Status:  Discontinued         02/09/25 1009    02/09/25 0900  sodium chloride 0.9 % flush 10 mL  Every 12 Hours Scheduled,   Status:  Discontinued         02/09/25 0632    02/09/25 0751  Protein / Creatinine Ratio, Urine - Urine, Clean Catch  STAT         02/09/25 0750    02/09/25 0745  oxytocin (PITOCIN) 30 units in 0.9% sodium chloride 500 mL (premix)  Continuous        Placed in \"Followed by\" Linked Group    02/09/25 0632    02/09/25 0745  lactated ringers bolus 1,000 mL  Once,   Status:  Discontinued         02/09/25 0658    02/09/25 0745  fentaNYL 2mcg/mL and ropivacaine 0.2% in NS epidural 100mL  Continuous,   Status:  Discontinued         02/09/25 0658    02/09/25 0730  lactated ringers infusion  Continuous,   Status:  Discontinued         02/09/25 0632    02/09/25 0715  oxytocin (PITOCIN) 30 units in 0.9% sodium chloride 500 mL (premix)  Once,   Status:  Discontinued        Placed in \"Followed by\" Linked Group    02/09/25 0632    02/09/25 0659  Vital Signs Per Anesthesia Guidelines  Per Order Details,   Status:  Canceled        Comments: Every 2 Minutes x5, Every 5 Minutes x4, Then If Stable Every 15 Minutes    " 02/09/25 0658    02/09/25 0659  Start IV (16 or 18 Gauge)  Once,   Status:  Canceled         02/09/25 0658    02/09/25 0659  Fetal Heart Rate Monitor  Once,   Status:  Canceled         02/09/25 0658    02/09/25 0659  Nurse or Anesthesiologist to Remain With Patient for 15 Minutes Following Dosing  Continuous,   Status:  Canceled         02/09/25 0658    02/09/25 0659  Facilitate Maternal Position on Side & Maintain Uterine Displacement  Continuous,   Status:  Canceled         02/09/25 0658    02/09/25 0659  Consult Anesthesia Prior to Changing Epidural Infusion / Rate  Continuous,   Status:  Canceled         02/09/25 0658    02/09/25 0659  Notify Provider  Until Discontinued,   Status:  Canceled         02/09/25 0658    02/09/25 0658  ePHEDrine injection 5 mg  Every 10 Minutes PRN,   Status:  Discontinued         02/09/25 0658    02/09/25 0658  ondansetron (ZOFRAN) injection 4 mg  Once As Needed         02/09/25 0658    02/09/25 0633  Code Status and Medical Interventions: CPR (Attempt to Resuscitate); Full Support  Continuous,   Status:  Canceled         02/09/25 0632    02/09/25 0633  Obtain Informed Consent  Once,   Status:  Canceled         02/09/25 0632    02/09/25 0633  Vital Signs Per Hospital Policy  Per Hospital Policy/Protocol,   Status:  Canceled         02/09/25 0632    02/09/25 0633  Mini-Prep Prior to Delivery  Once,   Status:  Canceled         02/09/25 0632    02/09/25 0633  Continuous Fetal Monitoring With NST on Admission and Prior to Initiation of Oxytocin.  Per Order Details,   Status:  Canceled        Comments: Continuous Fetal Monitoring With NST on Admission & Prior to Initiation of Oxytocin.    02/09/25 0632    02/09/25 0633  External Uterine Contraction Monitoring  Per Hospital Policy/Protocol,   Status:  Canceled         02/09/25 0632    02/09/25 0633  Notify Provider (Specified)  Until Discontinued,   Status:  Canceled         02/09/25 0632    02/09/25 0633  Notify Provider of Tachysystole  (Per Hospital Algorithm)  Until Discontinued,   Status:  Canceled         25 0632    25 0633  Notify Provider if Membranes Ruptured, Bleeding Greater Than 1 Pad Per Hour, Fetal Heart Tone Abnormality or Severe Pain  Until Discontinued,   Status:  Canceled         25 0632    25 0633  Initiate Group Beta Strep (GBS) Prophylaxis Protocol, If Criteria Met  Continuous,   Status:  Canceled        Comments: NO TREATMENT RECOMMENDED IF: 1) Maternal GBS Status Known Negative 2) Scheduled  Birth With Intact Membranes, Not in Labor 3) Maternal GBS Status Unknown, No Risk Factors  TREAT WITH ANTIBIOTICS IF:  1) Maternal GBS Status Known Positive 2) Maternal GBS Status Unknown With Risk Factors: a)  Previous Infant Affected By GBS Infection b) GBS Urinary Tract Infection (UTI) or Bacteriuria During Pregnancy c) Unexplained Maternal Fever (100.4F (38C) or Greater) During Labor d)  Prolonged Rupture of Membranes (18 or More Hours) e) Gestational Age Less Than 37 Weeks    25 0632    25 0633  Inpatient Anesthesiology Consult  Once,   Status:  Canceled        Specialty:  Anesthesiology  Provider:  (Not yet assigned)    25 0632    25 0633  Treponema pallidum AB w/Reflex RPR  Once         25 0632    25 0633  CBC & Differential  Once         25 0632    25 0633  Comprehensive Metabolic Panel  Once         25 0632    25 0633  Type & Screen  Once         25 0632    25 0633  Insert Peripheral IV  Once,   Status:  Canceled         25 0632    25 0633  Saline Lock & Maintain IV Access  Continuous,   Status:  Canceled         25 0632    25 0633  Notify Provider (Specified)  Until Discontinued,   Status:  Canceled         25 0632    25 0633  Vital Signs Per Hospital Policy  Per Hospital Policy/Protocol,   Status:  Canceled         25 0632    25 0633  Fundal & Lochia Check  Per Order Details,    "Status:  Canceled        Comments: Every 15 Minutes x4, Then Every 30 Minutes x2, Then Every Shift    02/09/25 0632    02/09/25 0633  Fundal & Lochia Check  Every Shift,   Status:  Canceled       02/09/25 0632    02/09/25 0633  Diet: Regular/House; Fluid Consistency: Thin (IDDSI 0)  Diet Effective Now,   Status:  Canceled         02/09/25 0632    02/09/25 0633  Advance Diet As Tolerated -  Until Discontinued,   Status:  Canceled         02/09/25 0632    02/09/25 0633  Diet: Liquid; Clear Liquid; Fluid Consistency: Thin (IDDSI 0)  Diet Effective Now,   Status:  Canceled         02/09/25 0632    02/09/25 0633  CBC Auto Differential  PROCEDURE ONCE         02/09/25 0632 02/09/25 0632  ondansetron ODT (ZOFRAN-ODT) disintegrating tablet 4 mg  Every 6 Hours PRN,   Status:  Discontinued        Placed in \"Or\" Linked Group    02/09/25 0632    02/09/25 0632  ondansetron (ZOFRAN) injection 4 mg  Every 6 Hours PRN,   Status:  Discontinued        Placed in \"Or\" Linked Group    02/09/25 0632    02/09/25 0632  terbutaline (BRETHINE) injection 0.25 mg  As Needed,   Status:  Discontinued         02/09/25 0632 02/09/25 0632  mineral oil liquid 30 mL  Once As Needed,   Status:  Discontinued         02/09/25 0632    02/09/25 0632  famotidine (PEPCID) injection 20 mg  2 Times Daily PRN,   Status:  Discontinued        Placed in \"Or\" Linked Group    02/09/25 0632    02/09/25 0632  famotidine (PEPCID) tablet 20 mg  2 Times Daily PRN,   Status:  Discontinued        Placed in \"Or\" Linked Group    02/09/25 0632    02/09/25 0632  methylergonovine (METHERGINE) injection 200 mcg  Once As Needed,   Status:  Discontinued         02/09/25 0632    02/09/25 0632  carboprost (HEMABATE) injection 250 mcg  Every 15 Minutes PRN,   Status:  Discontinued         02/09/25 0632    02/09/25 0632  miSOPROStol (CYTOTEC) tablet 800 mcg  Once As Needed,   Status:  Discontinued         02/09/25 0632    02/09/25 0632  tranexamic acid 1000 mg in 100 mL 0.7% " NaCl infusion (premix)  Once As Needed,   Status:  Discontinued         02/09/25 0632    02/09/25 0632  Position Change - For Intra-Uterine Resusitation for Hypertonus, HyperStimulation or Non-Reassuring Fetal Status  As Needed,   Status:  Canceled       02/09/25 0632    02/09/25 0632  sodium chloride 0.9 % flush 10 mL  As Needed,   Status:  Discontinued         02/09/25 0632 02/09/25 0632  sodium chloride 0.9 % infusion 40 mL  As Needed,   Status:  Discontinued         02/09/25 0632    02/09/25 0632  lactated ringers bolus 1,000 mL  Once As Needed         02/09/25 0632 02/09/25 0632  acetaminophen (TYLENOL) tablet 650 mg  Every 4 Hours PRN,   Status:  Discontinued         02/09/25 0632 02/09/25 0632  butorphanol (STADOL) injection 2 mg  Every 3 Hours PRN,   Status:  Discontinued         02/09/25 0632 02/09/25 0631  VTE Prophylaxis Not Indicated: Low Risk; No Risk Factors (0)  Once         02/09/25 0631    02/09/25 0629  Admit To Obstetrics Inpatient  Once         02/09/25 0631 02/09/25 0601  Vital Signs  Per Hospital Policy/Protocol,   Status:  Canceled        Comments: Repeat blood pressure every 30 minutes, until specified.    02/09/25 0601 02/09/25 0601  Fetal Nonstress Test  Once,   Status:  Canceled        Comments: For any patient >= 24 wks gestation.    02/09/25 0601    02/09/25 0601  Pulse Oximetry, Spot  Once,   Status:  Canceled         02/09/25 0601    02/09/25 0601  POC Glucose STAT  STAT,   Status:  Canceled        Comments: For any patient with any type of diabetes.      02/09/25 0601    02/09/25 0601  Continuous Uterine Monitoring - For Gestational Age >24 weeks  Once         02/09/25 0601    02/09/25 0601  NPO Diet NPO Type: Strict NPO  Diet Effective Now,   Status:  Canceled         02/09/25 0601    02/09/25 0601  Vaginal Exam  Once        Comments: Perform unless patient has vaginal bleeding without known placental site, known placental previa, <36 weeks with no abdominal , or  complain of ROM and <36 weeks    25 0601    25 0601  POC PH Fluid (Nitrazine)  Once,   Status:  Canceled        Comments: If patient is presenting with possible ROM or leaking      25 0601    25 0601  Rapid Assay For ROM - Amniotic Fluid, Amniotic Sac  Once         25 0601    25 0000  Hepatitis C Antibody        Comments: This is an external result entered through the Results Console.      25 0643                     Operative/Procedure Notes (last 72 hours)        Trey Armenta MD at 25 1713          DELIVERY REPORT    [facility]  Patient: Leeroy Cunningham  : 1995  Age: 29 y.o.  Unit Number: [id number]    DATE OF DELIVERY: 2025    PREOPERATIVE DIAGNOSIS:  1)  Intrauterine Pregnancy at 39 weeks.  2)  History of Intrauterine Fetal Demise.  3)  Large for Gestational Age.    POSTOPERATIVE DIAGNOSIS:  Same as pre-op diagnosis    PROCEDURE PERFORMED:   Spontaneous Vaginal Delivery    SURGEON: Trey Armenta MD    ASSISTANT:   None    ANESTHESIA:   Epidural    ESTIMATED BLOOD LOSS:   500 ml    FINDINGS:     Live Viable Female Infant //  Weight  8 lbs  3 oz // Apgar 1 minute 9 and Apgar 5 minutes 9             Normal placenta and cord                        1st Degree laceration    DESCRIPTION OF PROCEDURE: Patient is a 29 year old G2 with previous history of intrauterine fetal demise who presented in active labor at 38 weeks.  She had reassuring fetal tracing and received an epidural for pain control.  Patient had spontaneous rupture of membranes and progress along a normal labor curve.  She required some pitocin for augmentation.  An intrauterine pressure catheter was placed.  She reached the second stage of labor and began pushing.  She pushed for less than 30 minutes and delivery team was assembled.  Perineum was prepped with betadine and patient continued to push.  With continued pushing, she delivered a live viable  female infant in the occiput anterior position.  The nose and mouth were bulb suctioned upon presentation of the head.  With continued pushing, the shoulders were not direct anterior/posterior position.  With palpation of the anterior shoulder during pushing, the shoulder was guided thru the introitus, reducing the posterior shoulder.  The remainder of the torso and body were delivered without difficulty.  Infant was vigorous, delayed cord clamping occurred, the cord was cut and infant was presented to family.  Cord blood was obtained and placenta was delivered spontaneously intact with 3 vessel cord was noted.  A first degree vaginal laceration was repaired with 3-0 Monocryl without difficulty.  Pitocin was given for prevention of uterine atony.    COMPLICATIONS: None  SPECIMEN:  Cord blood  DISPOSITION:  Mother and baby remained in LDR in stable condition       Trey Armenta MD  Completed: 2025     Electronically signed by Trey Armenta MD at 25 1742          Physician Progress Notes (last 72 hours)        Isabella Sargent APRN at 02/10/25 0935       Attestation signed by Deric Muhammad MD at 02/10/25 1203    I have reviewed this documentation and agree.    Assessment:  1.  29-year-old  2 para 2 status post spontaneous vaginal delivery, postpartum day #1  2.  Elevated liver function test, downward trending.  No evidence of preeclampsia/HELLP  3.  Anemia pregnancy, delivery, asymptomatic  4.  Rubella nonimmune, MMR ordered    Plan:  1.  Patient wishes for discharge home today.  Patient appears to be meeting all milestones for discharge.  She did have unexplained elevations of liver function tests, but without concerning findings.  Her blood pressure was normal.  I do not think this is related to preeclampsia or HELLP.  Her LFTs are actually downtrending today.  This can be followed up in the outpatient setting.  GI referral has been placed.  Hepatitis panel is  pending today.                  Postpartum Progress Note      Status post Vaginal Delivery: Doing well postoperatively.     1) Postpartum care immediately following delivery :    Routine care, continue current care. Desires discharge home today. Reviewed discharge instructions in detail.  used for this visit    2) Elevated LFT: Denies hx abnormal LFT. Trending down this AM. -->96, AST 55-->49. Platelets normal. BP normal range. Has pending hepatitis panel ordered. Advised will enter referral to GI for further evaluation and management    3) Postpartum anemia: Acute on chronic. Secondary to acute blood loss at delivery.  cc. Hgb 11.8-->9.6.  Asymptomatic. Will d/c home with iron supplement     Rh status: O+  Syphilis screen in hospital: nonreactive  Rubella: Not immune, MMR ordered  Gender: Female    Subjective     Postpartum Day 1: Vaginal delivery    The patient feels well. The patient denies emotional concerns. Pain is well controlled with current medications. The baby is well. The patient is ambulating well. The patient is tolerating a normal diet.     Objective     Vital signs in last 24 hours:  Temp:  [97.9 °F (36.6 °C)-99.8 °F (37.7 °C)] 97.9 °F (36.6 °C)  Heart Rate:  [] 82  Resp:  [16-18] 18  BP: ()/(19-94) 109/67      General:    alert, appears stated age, and cooperative   CV: RRR, no m/r/g   Lungs: CTAB   Abdomen:  Soft, Non-tender    Lochia:  appropriate   Uterine Fundus:   firm   Ext    Edema trace   DVT Evaluation:  No evidence of DVT seen on physical exam.     Lab Results   Component Value Date    WBC 11.12 (H) 02/10/2025    HGB 9.6 (L) 02/10/2025    HCT 27.4 (L) 02/10/2025    MCV 91.9 02/10/2025     02/10/2025       Isabella Sargent, APRN  2/10/2025  09:35 EST       Electronically signed by Deric Muhammad MD at 02/10/25 1203       Consult Notes (last 72 hours)  Notes from 02/08/25 1103 through 02/11/25 1103   No notes of this type exist  for this encounter.          Discharge Summary        Isabella Sargent APRN at 02/10/25 4393       Attestation signed by Deric Muhammad MD at 02/10/25 120    I have reviewed this documentation and agree.                  Date of Discharge:  2/10/2025    Discharge Diagnosis: s/p vaginal delivery     Presenting Problem/History of Present Illness  Pregnancy [Z34.90]     Hospital Course  Patient is a 29 y.o. female presented with contractions.  Her pregnancy was complicated by history of term fetal demise.  She delivered a viable female infant weighing 8lb 3.2oz with apgars of 9&9. For further information surrounding this delivery please seen delivery note. Her postpartum course has been complicated by elevated liver function studies, AST and ALT are improving. Collecting hepatitis panel today. She will follow up with gastroenterology. She is voiding adequately, tolerating a regular diet, and she is ambulating without difficulty or assistance. Her pain is well controlled. We have reviewed discharge instructions in detail.     Procedures Performed         Consults:   Consults       No orders found for last 30 day(s).            Pertinent Test Results:   WBC   Date Value Ref Range Status   02/10/2025 11.12 (H) 3.40 - 10.80 10*3/mm3 Final     RBC   Date Value Ref Range Status   02/10/2025 2.98 (L) 3.77 - 5.28 10*6/mm3 Final     Hemoglobin   Date Value Ref Range Status   02/10/2025 9.6 (L) 12.0 - 15.9 g/dL Final     Hematocrit   Date Value Ref Range Status   02/10/2025 27.4 (L) 34.0 - 46.6 % Final     MCV   Date Value Ref Range Status   02/10/2025 91.9 79.0 - 97.0 fL Final     MCH   Date Value Ref Range Status   02/10/2025 32.2 26.6 - 33.0 pg Final     MCHC   Date Value Ref Range Status   02/10/2025 35.0 31.5 - 35.7 g/dL Final     RDW   Date Value Ref Range Status   02/10/2025 13.0 12.3 - 15.4 % Final     RDW-SD   Date Value Ref Range Status   02/10/2025 42.7 37.0 - 54.0 fl Final     MPV   Date Value Ref  Range Status   02/10/2025 9.9 6.0 - 12.0 fL Final     Platelets   Date Value Ref Range Status   02/10/2025 294 140 - 450 10*3/mm3 Final     Neutrophil %   Date Value Ref Range Status   02/10/2025 66.1 42.7 - 76.0 % Final     Lymphocyte %   Date Value Ref Range Status   02/10/2025 21.7 19.6 - 45.3 % Final     Monocyte %   Date Value Ref Range Status   02/10/2025 10.6 5.0 - 12.0 % Final     Eosinophil %   Date Value Ref Range Status   02/10/2025 0.9 0.3 - 6.2 % Final     Basophil %   Date Value Ref Range Status   02/10/2025 0.3 0.0 - 1.5 % Final     Immature Grans %   Date Value Ref Range Status   02/10/2025 0.4 0.0 - 0.5 % Final     Neutrophils, Absolute   Date Value Ref Range Status   02/10/2025 7.35 (H) 1.70 - 7.00 10*3/mm3 Final     Lymphocytes, Absolute   Date Value Ref Range Status   02/10/2025 2.41 0.70 - 3.10 10*3/mm3 Final     Monocytes, Absolute   Date Value Ref Range Status   02/10/2025 1.18 (H) 0.10 - 0.90 10*3/mm3 Final     Eosinophils, Absolute   Date Value Ref Range Status   02/10/2025 0.10 0.00 - 0.40 10*3/mm3 Final     Basophils, Absolute   Date Value Ref Range Status   02/10/2025 0.03 0.00 - 0.20 10*3/mm3 Final     Immature Grans, Absolute   Date Value Ref Range Status   02/10/2025 0.05 0.00 - 0.05 10*3/mm3 Final     nRBC   Date Value Ref Range Status   02/10/2025 0.0 0.0 - 0.2 /100 WBC Final       Condition on Discharge:  Stable    Vital Signs  Temp:  [97.9 °F (36.6 °C)-99.8 °F (37.7 °C)] 97.9 °F (36.6 °C)  Heart Rate:  [] 82  Resp:  [16-18] 18  BP: ()/(19-94) 109/67    Physical Exam:   See Progress Note    Discharge Disposition  Home or Self Care    Discharge Medications     Discharge Medications        New Medications        Instructions Start Date   docusate sodium 100 MG capsule   100 mg, Oral, 2 Times Daily      ibuprofen 600 MG tablet  Commonly known as: ADVIL,MOTRIN   600 mg, Oral, Every 6 Hours PRN             Continue These Medications        Instructions Start Date   ferrous  sulfate 325 (65 FE) MG tablet   325 mg, Oral, Daily With Breakfast      Prenatal 28-0.8 MG tablet   1 tablet, Oral, Daily, Please use formulary or generic, with DHA ideal             Stop These Medications      aspirin 81 MG chewable tablet     methylPREDNISolone 4 MG dose pack  Commonly known as: MEDROL              Discharge Diet: Regular    Activity at Discharge: Pelvic rest X6 weeks    Education: Warning signs and symptoms given, no tub baths, nothing in the vagina for 6 weeks, no driving for 2 weeks or while talking narcotics     Follow-up Appointments  Future Appointments   Date Time Provider Department Center   3/20/2025 10:30 AM Lewis Molina MD [facility]     Additional Instructions for the Follow-ups that You Need to Schedule       Discharge Follow-up with Specified Provider: Jesse; 6 Weeks   As directed      To: Jesse   Follow Up: 6 Weeks   Follow Up Details: Postpartum follow up                Test Results Pending at Discharge       Isabella Sargent, ELI  02/10/25  09:40 EST    Time: 09:40 EST         Electronically signed by Deric Muhammad MD at 02/10/25 4287

## 2025-02-13 NOTE — PROGRESS NOTES
"Continued Stay Note  [facility]     Patient Name: Leeroy Cunningham  MRN: [id number]  Today's Date: 2/13/2025    Admit Date: 2/9/2025    Plan: Infant may discharge to mother when medically ready; CSW will follow cord tox. TODD Ortiz.   Discharge Plan       Row Name 02/13/25 1315       Plan    Plan Comments Mother: Leeroy Cunningham, MRN: [id number]; infant: Peter \"Haris\" Octavio, MRN: [id number]. CSW has reviewed infant's cord toxicology results and infant's cords was negative for any substances. Mandated CPS reporting is not required at this time. TODD Ortiz.                   Discharge Codes    No documentation.                 Expected Discharge Date and Time       Expected Discharge Date Expected Discharge Time    Feb 10, 2025               SIERRA Sam    "

## 2025-02-17 ENCOUNTER — PATIENT OUTREACH (OUTPATIENT)
Dept: LABOR AND DELIVERY | Facility: HOSPITAL | Age: 30
End: 2025-02-17
Payer: MEDICAID

## 2025-02-17 NOTE — OUTREACH NOTE
"  Motherhood Connection  Postpartum Check-In    Questions/Answers      Flowsheet Row Responses   Visit Setting Telephone   Best Method for Contacting Cell   OB Discharge Note Reviewed  Reviewed   OB Discharge Navigator Reviewed  Reviewed   OB Discharge Medications Reviewed  Reviewed    discharged home with mother? Yes   Current Pain Levels 0-10 0   At Rest Pain Levels 0-10 0   Pain level with activity 0-10 0   Acceptable Pain Level 0-10 5   How do you treat your pain? Medications  [\"sometimes uncomfortable from childbirth\"]   Verbalized Emotional State Acceptance   Family/Support Network Friend   Level of Involvement in Care Attentive, Interactive, Supportive   Do you feel comfortable in your relationship with your baby? Yes   Have members of your household adjusted to your baby? Yes   Is the baby's father supportive and/or involved with the baby? N/A   Do you feel safe at home, school and work? Yes   Are you in a relationship with someone who threatens you or hurts you? N/A   Do you have the resources to keep yourself and your baby healthy and safe? Yes   Amount Scant   Number of pads per day 5   Lochia Odor None   Is patient breastfeeding? Yes, pumping   pumping - Left Breast Soft, Nontender   Pumping - Right Breast Soft, Nontender   Pumping - Left Nipple Intact, Painful  [enc lanolin, lact visit. \"sore\"]   Pumping - Right Nipple Intact, Painful  [enc lanolin, lact visit. \"sore\"]   Frequency depending   Pumping Quantity depending, 2-3oz   Storage of Milk freezer   Postpartum Depression Screening Education Education Provided   Doctor Appointments: Education Provided   Breastfeeding Education Education Provided   Postpartum Care Education Education Provided   S & S to report Education Provided   Followup Appointments Made Yes   Well Child Visit Appointments Made Yes   Appointment Date 25   Provider/Agency Jesse   Well Child Checkup Provider Name Chavez   Well Child Check Up Date: 25   Did you " complete the visit? Yes   Were there any specific concerns? No   Umbilical Cord No reported signs or symptoms   Was the baby circumcised? No   Infant Feeding Method Breast   Is a lactation referral indicated? No   Frequency of feedings q3h   Number of wet diapers x 24 hours 8   Last BM x 24 hours 3   What safe sleep surface is available? Crib   Are there stuffed animals, toys, pillows, quilts, blankets, wedges, positioners, bumpers or other loose bedding in the infant's sleeping environment? No   Where does the baby usually sleep? Crib   Does the baby ever share a sleep surface with a sibling, adult or pet? No   Does the baby ever share a sleep surface in a bed, couch, recliner or other? No   What position do you place your baby to sleep for naps? Side   What position do you place your baby to sleep at night Side   Are you and/or other caregivers smoking inside or outside the baby's home? No   Is the infant dressed appropiately for the temperature of the home? Yes   Do you use a clean, dry pacifier that is not attached to a string or stuffed animal? No            Review of Systems    Most Recent Porter  Depression Scale Score (EPDS)    Performed by a clinician: 0 (2025 12:58 PM)    Received via [employer] questionnaire:  ()     5 Ps Screen  completed    Pt doing well today, no c/o. Has a friend for support.  Has all supplies. Pt has not called [facility] PP, encouraged.  Breastfeeding going well.  Reviewed safe sleep techniques, not to put baby on side.  and PP Maternal warning s/s reviewed, pt verbalized understanding.  Chart to call center. Grad letter sent + diaper resources, [facility] info, and [employer].     [id number] used.    Elio Montiel RN  Maternity Nurse Navigator    2025, 12:59 EST

## 2025-02-24 ENCOUNTER — PATIENT OUTREACH (OUTPATIENT)
Dept: CALL CENTER | Facility: HOSPITAL | Age: 30
End: 2025-02-24
Payer: MEDICAID

## 2025-02-24 NOTE — OUTREACH NOTE
Motherhood Connection Survey      Flowsheet Row Responses   [facility] [facility] [id number][facility]   Week 1 attempt successful? Yes   Call start time 1746   Call end time 175   Baby sex Girl   [place] discharged home with mother? Yes   Baby sex Girl   Emotional state Acceptance   Family support Yes   Do you have all necessary resources to care for you and your baby?  Yes   Have members of your household adjusted to your baby? Yes   Did you have any problems with pre-eclampsia during this pregnancy? No   Do you have any of the following: No Issues   Did you have blood glucose issues during this pregnancy No   Lochia amount None   Did you have an episiotomy/tear/abdominal incision? No   Additional comments Vaginal   Feeding Method Breast   Frequency Every 2-3 hours   Breast Condition No   Nipple Condition No   Signs baby is ready to eat Crying   Feeding tolerance Weight gain, Wet/dirty diapers   Number of wet diapers x 24 hours 8   Last BM x 24 hours 8   Umbilical Cord No reported signs or symptoms   Where does the baby usually sleep? Crib   Are there stuffed animals, toys, pillows, quilts, blankets, wedges, positioners, bumpers or other loose bedding in the infant's sleeping environment? No   Does the baby ever share a sleep surface in a bed, couch, recliner or other? No   What position do you lay your baby down to sleep? Back   Are you and/or other caregivers smoking inside or outside the baby's home? No   Mom appointment comments: Mom will go on  for followup   Baby appointment comments: Baby has been for followups.   Call completed? Yes              Andi CASTILLO - Registered Nurse

## 2025-04-25 NOTE — PROGRESS NOTES
"Chief Complaint   Patient presents with    Elevated Hepatic Enzymes         History of Present Illness  Patient is a 29-year-old female who presents today for evaluation, Referred for elevated liver function test.  This was discovered during hospitalization for birth of her daughter, delivered 2/9/2025.  Hepatitis panel was negative.    Patient is in today for follow-up.  She denies any past history of liver disease.  Denies any abdominal pain, nausea, or vomiting.  No family history of liver disease.    Reports no complications during recent pregnancy or delivery.     Result Review :       Admission (Discharged) with Lewis Molina MD (02/09/2025)    Comprehensive Metabolic Panel (02/10/2025 05:55)    Referral to Gastroenterology for Elevated LFTs (02/10/2025)    Vital Signs:   BP 96/68   Pulse 88   Temp 97.5 °F (36.4 °C)   Ht 157 cm (61.81\")   Wt 57.8 kg (127 lb 6.4 oz)   BMI 23.44 kg/m²     Body mass index is 23.44 kg/m².     Physical Exam  Vitals reviewed.   Constitutional:       General: She is not in acute distress.     Appearance: She is well-developed.   HENT:      Head: Normocephalic and atraumatic.   Pulmonary:      Effort: Pulmonary effort is normal. No respiratory distress.   Abdominal:      General: Abdomen is flat. Bowel sounds are normal. There is no distension.      Palpations: Abdomen is soft. There is no hepatomegaly.      Tenderness: There is no abdominal tenderness.   Skin:     General: Skin is dry.      Coloration: Skin is not pale.   Neurological:      Mental Status: She is alert and oriented to person, place, and time.   Psychiatric:         Thought Content: Thought content normal.           Assessment and Plan    Diagnoses and all orders for this visit:    1. Elevated liver enzymes (Primary)  -     US Abdomen Limited; Future  -     Alpha - 1 - Antitrypsin  -     SHARON  -     Anti-Smooth Muscle Antibody Titer  -     Ferritin  -     Celiac Disease Panel  -     Ceruloplasmin  -     Gamma " GT  -     IgG, IgA, IgM  -     Iron Profile  -     Mitochondrial Antibodies, M2  -     Hepatic Function Panel         Discussion  Patient presents today for evaluation of elevated liver enzymes.  This was a new finding discovered during recent hospitalization for childbirth.  Will check lab work today to evaluate for potential cause of this as well as to reassess liver enzymes.  We will schedule a right upper quadrant ultrasound for further evaluation.  Will provide further recommendations dependent upon test findings and clinical course.          Follow Up   Return for Follow up to review results after testing complete.    Patient Instructions   Check lab work today to evaluate cause of liver enzyme elevation.    Schedule right upper quadrant ultrasound for further evaluation of elevated liver enzymes.

## 2025-04-28 ENCOUNTER — HOSPITAL ENCOUNTER (OUTPATIENT)
Dept: ULTRASOUND IMAGING | Facility: HOSPITAL | Age: 30
Discharge: HOME | End: 2025-04-28
Payer: MEDICAID

## 2025-04-28 ENCOUNTER — LAB (OUTPATIENT)
Dept: LAB | Facility: HOSPITAL | Age: 30
End: 2025-04-28
Payer: MEDICAID

## 2025-04-28 ENCOUNTER — OFFICE VISIT (OUTPATIENT)
Dept: GASTROENTEROLOGY | Facility: CLINIC | Age: 30
End: 2025-04-28
Payer: MEDICAID

## 2025-04-28 VITALS
DIASTOLIC BLOOD PRESSURE: 68 MMHG | WEIGHT: 127.4 LBS | BODY MASS INDEX: 23.45 KG/M2 | SYSTOLIC BLOOD PRESSURE: 96 MMHG | HEART RATE: 88 BPM | TEMPERATURE: 97.5 F | HEIGHT: 62 IN

## 2025-04-28 DIAGNOSIS — R74.8 ELEVATED LIVER ENZYMES: ICD-10-CM

## 2025-04-28 DIAGNOSIS — R74.8 ELEVATED LIVER ENZYMES: Primary | ICD-10-CM

## 2025-04-28 LAB
ALBUMIN SERPL-MCNC: 4.1 G/DL (ref 3.5–5.2)
ALP SERPL-CCNC: 131 U/L (ref 39–117)
ALPHA1 GLOB MFR UR ELPH: 146 MG/DL (ref 90–200)
ALT SERPL W P-5'-P-CCNC: 42 U/L (ref 1–33)
AST SERPL-CCNC: 27 U/L (ref 1–32)
BILIRUB CONJ SERPL-MCNC: <0.2 MG/DL (ref 0–0.3)
BILIRUB INDIRECT SERPL-MCNC: ABNORMAL MG/DL
BILIRUB SERPL-MCNC: 0.4 MG/DL (ref 0–1.2)
CERULOPLASMIN SERPL-MCNC: 34 MG/DL (ref 19–39)
FERRITIN SERPL-MCNC: 159.9 NG/ML (ref 13–150)
GGT SERPL-CCNC: 42 U/L (ref 5–36)
IGA1 MFR SER: 493 MG/DL (ref 70–400)
IGG1 SER-MCNC: 1549 MG/DL (ref 700–1600)
IGM SERPL-MCNC: 61 MG/DL (ref 40–230)
IRON 24H UR-MRATE: 32 MCG/DL (ref 37–145)
IRON SATN MFR SERPL: 10 % (ref 20–50)
PROT SERPL-MCNC: 8.1 G/DL (ref 6–8.5)
TIBC SERPL-MCNC: 313 MCG/DL (ref 298–536)
TRANSFERRIN SERPL-MCNC: 210 MG/DL (ref 200–360)

## 2025-04-28 PROCEDURE — 36415 COLL VENOUS BLD VENIPUNCTURE: CPT | Performed by: NURSE PRACTITIONER

## 2025-04-28 PROCEDURE — 82977 ASSAY OF GGT: CPT | Performed by: NURSE PRACTITIONER

## 2025-04-28 PROCEDURE — 1160F RVW MEDS BY RX/DR IN RCRD: CPT | Performed by: NURSE PRACTITIONER

## 2025-04-28 PROCEDURE — 86381 MITOCHONDRIAL ANTIBODY EACH: CPT | Performed by: NURSE PRACTITIONER

## 2025-04-28 PROCEDURE — 1159F MED LIST DOCD IN RCRD: CPT | Performed by: NURSE PRACTITIONER

## 2025-04-28 PROCEDURE — 83540 ASSAY OF IRON: CPT | Performed by: NURSE PRACTITIONER

## 2025-04-28 PROCEDURE — 82784 ASSAY IGA/IGD/IGG/IGM EACH: CPT | Performed by: NURSE PRACTITIONER

## 2025-04-28 PROCEDURE — 82728 ASSAY OF FERRITIN: CPT | Performed by: NURSE PRACTITIONER

## 2025-04-28 PROCEDURE — 99213 OFFICE O/P EST LOW 20 MIN: CPT | Performed by: NURSE PRACTITIONER

## 2025-04-28 PROCEDURE — 86038 ANTINUCLEAR ANTIBODIES: CPT | Performed by: NURSE PRACTITIONER

## 2025-04-28 PROCEDURE — 86015 ACTIN ANTIBODY EACH: CPT | Performed by: NURSE PRACTITIONER

## 2025-04-28 PROCEDURE — 82390 ASSAY OF CERULOPLASMIN: CPT | Performed by: NURSE PRACTITIONER

## 2025-04-28 PROCEDURE — 86231 EMA EACH IG CLASS: CPT | Performed by: NURSE PRACTITIONER

## 2025-04-28 PROCEDURE — 84466 ASSAY OF TRANSFERRIN: CPT | Performed by: NURSE PRACTITIONER

## 2025-04-28 PROCEDURE — 76705 ECHO EXAM OF ABDOMEN: CPT

## 2025-04-28 PROCEDURE — 82103 ALPHA-1-ANTITRYPSIN TOTAL: CPT | Performed by: NURSE PRACTITIONER

## 2025-04-28 PROCEDURE — 80076 HEPATIC FUNCTION PANEL: CPT | Performed by: NURSE PRACTITIONER

## 2025-04-28 PROCEDURE — 86364 TISS TRNSGLTMNASE EA IG CLAS: CPT | Performed by: NURSE PRACTITIONER

## 2025-04-28 RX ORDER — CEPHALEXIN 500 MG/1
500 CAPSULE ORAL 4 TIMES DAILY
COMMUNITY

## 2025-04-28 RX ORDER — SENNOSIDES 8.6 MG
CAPSULE ORAL
COMMUNITY
Start: 2025-04-23

## 2025-04-29 LAB
ANA SER QL: NEGATIVE
ENDOMYSIUM IGA SER QL: NEGATIVE
IGA SERPL-MCNC: 466 MG/DL (ref 87–352)
MITOCHONDRIA M2 IGG SER-ACNC: <20 UNITS (ref 0–20)
SMA IGG SER-ACNC: 15 UNITS (ref 0–19)
TTG IGA SER-ACNC: <2 U/ML (ref 0–3)